# Patient Record
Sex: MALE | Race: WHITE | NOT HISPANIC OR LATINO | Employment: FULL TIME | URBAN - METROPOLITAN AREA
[De-identification: names, ages, dates, MRNs, and addresses within clinical notes are randomized per-mention and may not be internally consistent; named-entity substitution may affect disease eponyms.]

---

## 2017-11-10 ENCOUNTER — ALLSCRIPTS OFFICE VISIT (OUTPATIENT)
Dept: OTHER | Facility: OTHER | Age: 42
End: 2017-11-10

## 2017-11-10 DIAGNOSIS — E78.5 HYPERLIPIDEMIA: ICD-10-CM

## 2017-12-12 ENCOUNTER — GENERIC CONVERSION - ENCOUNTER (OUTPATIENT)
Dept: OTHER | Facility: OTHER | Age: 42
End: 2017-12-12

## 2017-12-14 ENCOUNTER — LAB CONVERSION - ENCOUNTER (OUTPATIENT)
Dept: OTHER | Facility: OTHER | Age: 42
End: 2017-12-14

## 2017-12-14 LAB
A/G RATIO (HISTORICAL): 2 (CALC) (ref 1–2.5)
ALBUMIN SERPL BCP-MCNC: 4.7 G/DL (ref 3.6–5.1)
ALP SERPL-CCNC: 54 U/L (ref 40–115)
ALT SERPL W P-5'-P-CCNC: 15 U/L (ref 9–46)
AST SERPL W P-5'-P-CCNC: 17 U/L (ref 10–40)
BILIRUB SERPL-MCNC: 0.8 MG/DL (ref 0.2–1.2)
BUN SERPL-MCNC: 20 MG/DL (ref 7–25)
BUN/CREA RATIO (HISTORICAL): NORMAL (CALC) (ref 6–22)
CALCIUM SERPL-MCNC: 9.5 MG/DL (ref 8.6–10.3)
CHLORIDE SERPL-SCNC: 104 MMOL/L (ref 98–110)
CHOLEST SERPL-MCNC: 178 MG/DL
CO2 SERPL-SCNC: 28 MMOL/L (ref 20–31)
CREAT SERPL-MCNC: 1.18 MG/DL (ref 0.6–1.35)
EGFR AFRICAN AMERICAN (HISTORICAL): 86 ML/MIN/1.73M2
EGFR-AMERICAN CALC (HISTORICAL): 75 ML/MIN/1.73M2
GAMMA GLOBULIN (HISTORICAL): 2.4 G/DL (CALC) (ref 1.9–3.7)
GLUCOSE (HISTORICAL): 90 MG/DL (ref 65–99)
HDLC SERPL-MCNC: 49 MG/DL
LDL CHOLESTEROL DIRECT (HISTORICAL): 111 MG/DL
POTASSIUM SERPL-SCNC: 4.5 MMOL/L (ref 3.5–5.3)
SODIUM SERPL-SCNC: 141 MMOL/L (ref 135–146)
TOTAL PROTEIN (HISTORICAL): 7.1 G/DL (ref 6.1–8.1)
TRIGL SERPL-MCNC: 93 MG/DL

## 2017-12-21 ENCOUNTER — ALLSCRIPTS OFFICE VISIT (OUTPATIENT)
Dept: OTHER | Facility: OTHER | Age: 42
End: 2017-12-21

## 2017-12-22 NOTE — PROGRESS NOTES
Assessment   1  Hyperlipidemia (272 4) (E78 5)      1  Hyperlipidemia-responsive to weight loss  Triglycerides now normal with an LDL of 111  HDL is 49  He will continue to have yearly lab work with his work as a   I told him he should have follow-up exam in 2 years     2 health maintenance-again given prescription for Adacel vaccine REVIEW NEXT VISIT      #3 abnormal EKG-felt to be normal variant for this patient  He is a copy of his EKG to carry with him     #4 dyspepsia-had an EGD in 2008 in to be a gastritis  Had some issues over the last year related to antibiotics but at this point not an issue  Symptoms markedly improved with weight loss     #5 low back pain-an issue lumbar radiculopathy in the year 2012 which responded epidural  Had reinjury with low back pain later in the year 2012 and various treatments  This was 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  He had traction acupuncture  This point feels better and has no issues     #6 risk for melanoma -he has fair skin and cool eyes and knows he needs sunscreen     #7 benign nevi of the feet and sees dermatology yearly     #8 family history diabetes with his grandparents     #9 status post umbilical hernia repair          He will return for follow-up exam in 2 years  Has annual exam with the police department       Plan   Continue current diet and exercise program      History of Present Illness   HPI: He had concerns about hyperlipidemia  He is not sure of exact numbers but when he had screening labs done 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 was between 202 40  He has lost 30 pounds  Recent labs show an LDL of 111 cholesterol 178 triglycerides 93 chemistry profile normal with an HDL of 49  We had a long discussion today regarding the above  We reviewed atherosclerosis  We reviewed risk factors  He is not a smoker  He does not have diabetes or hypertension and denies strong family history of vascular disease   I told him his goal LDL is 100-130  He hopes to lose more weight  He says he will have access to yearly exam as a   otherwise feels well  He says his energy level is better  He has not had any reflux symptoms since he lost weight  This was a 30 minutes visit with more than 50% of the time spent counseling the patient on risk factors for atherosclerosis  Multiple questions answered and treatment plan formulated   had his annual exam with dermatology and had full skin exam a week ago was told all was benign      Review of Systems   Complete-Male:      Constitutional: No fever or chills, feels well, no tiredness, no recent weight gain or weight loss  Eyes: No complaints of eye pain, no red eyes, no discharge from eyes, no itchy eyes  ENT: no complaints of earache, no hearing loss, no nosebleeds, no nasal discharge, no sore throat, no hoarseness  Cardiovascular: No complaints of slow heart rate, no fast heart rate, no chest pain, no palpitations, no leg claudication, no lower extremity  Respiratory: No complaints of shortness of breath, no wheezing, no cough, no SOB on exertion, no orthopnea or PND  Gastrointestinal: No complaints of abdominal pain, no constipation, no nausea or vomiting, no diarrhea or bloody stools  Genitourinary: No complaints of dysuria, no incontinence, no hesitancy, no nocturia, no genital lesion, no testicular pain  Musculoskeletal: No complaints of arthralgia, no myalgias, no joint swelling or stiffness, no limb pain or swelling  Integumentary: No complaints of skin rash or skin lesions, no itching, no skin wound, no dry skin  Neurological: No compliants of headache, no confusion, no convulsions, no numbness or tingling, no dizziness or fainting, no limb weakness, no difficulty walking  Psychiatric: Is not suicidal, no sleep disturbances, no anxiety or depression, no change in personality, no emotional problems        Endocrine: No complaints of proptosis, no hot flashes, no muscle weakness, no erectile dysfunction, no deepening of the voice, no feelings of weakness  Hematologic/Lymphatic: No complaints of swollen glands, no swollen glands in the neck, does not bleed easily, no easy bruising  Active Problems   1  Chest pain (786 50) (R07 9)   2  Dyspepsia (536 8) (K30)   3  Hyperlipidemia (272 4) (E78 5)   4  Low back pain (724 2) (M54 5)   5  Muscle ache (729 1) (M79 1)   6  Need for prophylactic vaccination and inoculation against influenza (V04 81) (Z23)   7  Pigmented nevus (216 9) (D22 9)   8  Skin rash (782 1) (R21)    Past Medical History   1  History of Acute upper respiratory infection (465 9) (J06 9)   2  History of acute sinusitis (V12 69) (Z87 09)   3  History of common cold (V12 09) (Z86 19)   4  History of heartburn (V12 79) (Z87 898)   5  History of sinusitis (V12 69) (Z87 09)   6  History of Type B influenza (487 1) (J10 1)  Active Problems And Past Medical History Reviewed: The active problems and past medical history were reviewed and updated today  Surgical History   1  History of Umbilical Hernia Repair    Family History   Mother    1  Family history of Malignant Neoplasm Of The Oral Cavity (V16 0)   2  Family history of Mother's Year Of Birth   3  Family history of Rheumatoid Arthritis  Father    4  Family history of Father's Year Of Birth   5  Family history of Hypertension (V17 49)   6  Family history of Irritable Bowel Syndrome  Sister    7  Family history of Sister's Year Of Birth  Family History    8  Family history of Heart Disease (V17 49)  Family History Reviewed: The family history was reviewed and updated today  Social History    · Denied: History of Drug Use   · Never A Smoker   · Never Drank Alcohol  Social History Reviewed: The social history was reviewed and updated today  The social history was reviewed and is unchanged  Current Meds    1   Daily Vitamins Oral Tablet; TAKE 1 TABLET DAILY; Therapy: (Recorded:28Mar2014) to Recorded   2  MethylPREDNISolone 4 MG Oral Tablet Therapy Pack (Medrol); use as directed; Therapy: [id number] to (Last Rx:[id number])  Requested for: 22YUM2978 Ordered  Medication List Reviewed: The medication list was reviewed and updated today  Allergies   1  No Known Drug Allergies  2  No Known Environmental Allergies   3  No Known Food Allergies    Vitals   Vital Signs    Recorded: 21Dec2017 08:12AM Recorded: 00Jkk5339 07:30AM   Heart Rate 68    Respiration 14    Systolic 943    Diastolic 76    Height  6 ft    Weight  199 lb 6 oz   BMI Calculated  27 04   BSA Calculated  2 13     Physical Exam        Constitutional      General appearance: No acute distress, well appearing and well nourished  Head and Face      Head and face: Normal        Palpation of the face and sinuses: No sinus tenderness  Eyes      Conjunctiva and lids: No erythema, swelling or discharge  Pupils and irises: Equal, round, reactive to light  Ears, Nose, Mouth, and Throat      External inspection of ears and nose: Normal        Otoscopic examination: Tympanic membranes translucent with normal light reflex  Canals patent without erythema  Hearing: Normal        Nasal mucosa, septum, and turbinates: Normal without edema or erythema  Lips, teeth, and gums: Normal, good dentition  Oropharynx: Normal with no erythema, edema, exudate or lesions  Neck      Neck: Supple, symmetric, trachea midline, no masses  Thyroid: Normal, no thyromegaly  Pulmonary      Respiratory effort: No increased work of breathing or signs of respiratory distress  Percussion of chest: Normal        Palpation of chest: Normal        Auscultation of lungs: Clear to auscultation  Cardiovascular      Palpation of heart: Normal PMI, no thrills  Auscultation of heart: Normal rate and rhythm, normal S1 and S2, no murmurs         Carotid pulses: 2+ bilaterally  Abdominal aorta: Normal        Femoral pulses: 2+ bilaterally  Pedal pulses: 2+ bilaterally  Peripheral vascular exam: Normal        Examination of extremities for edema and/or varicosities: Normal        Chest      Breasts: Normal, no dimpling or skin changes appreciated  Palpation of breasts and axillae: Normal, no masses palpated  Chest: Normal        Abdomen      Abdomen: Non-tender, no masses  Liver and spleen: No hepatomegaly or splenomegaly  Examination for hernias: No hernias appreciated  Anus, perineum, and rectum: Normal sphincter tone, no masses, no prolapse  Genitourinary      Scrotal contents: Normal testes, no masses  Penis: Normal, no lesions  Lymphatic      Palpation of lymph nodes in neck: No lymphadenopathy  Palpation of lymph nodes in axillae: No lymphadenopathy  Palpation of lymph nodes in groin: No lymphadenopathy  Palpation of lymph nodes in other areas: No lymphadenopathy  Musculoskeletal      Gait and station: Normal        Inspection/palpation of digits and nails: Normal without clubbing or cyanosis  Inspection/palpation of joints, bones, and muscles: Normal        Range of motion: Normal        Stability: Normal        Muscle strength/tone: Normal        Skin      Skin and subcutaneous tissue: Normal without rashes or lesions  Palpation of skin and subcutaneous tissue: Normal turgor  Neurologic      Cranial nerves: Cranial nerves 2-12 intact  Reflexes: 2+ and symmetric  Sensation: No sensory loss  Psychiatric      Judgment and insight: Normal        Orientation to person, place and time: Normal        Recent and remote memory: Intact         Mood and affect: Normal        Signatures    Electronically signed by : SHANE Hull ; Dec 21 2017  8:48AM EST                       (Author)

## 2018-01-22 VITALS
HEART RATE: 68 BPM | SYSTOLIC BLOOD PRESSURE: 126 MMHG | RESPIRATION RATE: 14 BRPM | DIASTOLIC BLOOD PRESSURE: 76 MMHG | HEIGHT: 72 IN | WEIGHT: 199.38 LBS | BODY MASS INDEX: 27.01 KG/M2

## 2018-04-30 RX ORDER — AZITHROMYCIN 250 MG/1
TABLET, FILM COATED ORAL
Qty: 6 TABLET | OUTPATIENT
Start: 2018-04-30

## 2018-05-01 ENCOUNTER — TELEPHONE (OUTPATIENT)
Dept: INTERNAL MEDICINE CLINIC | Facility: CLINIC | Age: 43
End: 2018-05-01

## 2018-05-01 ENCOUNTER — DOCUMENTATION (OUTPATIENT)
Dept: INTERNAL MEDICINE CLINIC | Facility: CLINIC | Age: 43
End: 2018-05-01

## 2018-05-01 NOTE — PROGRESS NOTES
Patient called the office on May 1st feeling poorly with progressive worsening respiratory infection with severe ear pain and pain over the sinuses-concerned about bacterial infection-placed on Augmentin 875 milligrams b i d  for 1 week

## 2018-05-01 NOTE — TELEPHONE ENCOUNTER
Per doc    Augmentin 875 mg 1 po bid for 1 week disp 14 refills 0  otc afrin nasal spray 2 sprays in each nostril bid       Spoke with his wife , she is aware

## 2018-05-01 NOTE — TELEPHONE ENCOUNTER
It called stating he has been sick for about 2-3 days  Symptoms : pain I left ear , pain in the jaw lines     HE STATED HE THINKS he has a possoble sinus infection/  He has used otc tylenol sinus but nothing is helping     Please advise  Cb [phone]

## 2018-07-09 ENCOUNTER — TELEPHONE (OUTPATIENT)
Dept: INTERNAL MEDICINE CLINIC | Facility: CLINIC | Age: 43
End: 2018-07-09

## 2018-07-12 ENCOUNTER — OFFICE VISIT (OUTPATIENT)
Dept: INTERNAL MEDICINE CLINIC | Facility: CLINIC | Age: 43
End: 2018-07-12
Payer: COMMERCIAL

## 2018-07-12 VITALS
SYSTOLIC BLOOD PRESSURE: 126 MMHG | OXYGEN SATURATION: 98 % | DIASTOLIC BLOOD PRESSURE: 88 MMHG | HEART RATE: 79 BPM | TEMPERATURE: 98.4 F | RESPIRATION RATE: 16 BRPM

## 2018-07-12 DIAGNOSIS — H61.22 LEFT EAR IMPACTED CERUMEN: Primary | ICD-10-CM

## 2018-07-12 DIAGNOSIS — J01.90 ACUTE SINUSITIS, RECURRENCE NOT SPECIFIED, UNSPECIFIED LOCATION: ICD-10-CM

## 2018-07-12 DIAGNOSIS — H60.502 ACUTE OTITIS EXTERNA OF LEFT EAR, UNSPECIFIED TYPE: ICD-10-CM

## 2018-07-12 PROCEDURE — 69209 REMOVE IMPACTED EAR WAX UNI: CPT | Performed by: INTERNAL MEDICINE

## 2018-07-12 PROCEDURE — 99214 OFFICE O/P EST MOD 30 MIN: CPT | Performed by: INTERNAL MEDICINE

## 2018-07-12 RX ORDER — OFLOXACIN 3 MG/ML
10 SOLUTION AURICULAR (OTIC) DAILY
Qty: 5 ML | Refills: 0 | Status: SHIPPED | OUTPATIENT
Start: 2018-07-12 | End: 2018-07-19

## 2018-07-12 RX ORDER — CEFUROXIME AXETIL 250 MG/1
TABLET ORAL
COMMUNITY
Start: 2018-05-28 | End: 2021-03-02

## 2018-07-12 RX ORDER — METHYLPREDNISOLONE 4 MG/1
TABLET ORAL
COMMUNITY
Start: 2016-09-06 | End: 2021-03-02

## 2018-07-12 RX ORDER — AMOXICILLIN AND CLAVULANATE POTASSIUM 875; 125 MG/1; MG/1
TABLET, FILM COATED ORAL
Refills: 0 | COMMUNITY
Start: 2018-05-01 | End: 2021-03-02

## 2018-07-12 RX ORDER — AZITHROMYCIN 250 MG/1
TABLET, FILM COATED ORAL
Refills: 1 | COMMUNITY
Start: 2018-04-29 | End: 2021-03-02

## 2018-07-12 NOTE — PATIENT INSTRUCTIONS
1, ENT specialist appointment - Dr Marly Cadet, [address] in [place], [place] at 1:15pm  2  Once earwax is removed, recommend to use antibiotic ear drops    Cerumen Impaction   WHAT YOU NEED TO KNOW:   Cerumen impaction is the blockage of the outer ear canal by tightly packed cerumen (earwax)  It is generally treated with procedures such as flushing or suctioning the ear canal or the use of instruments to remove the impaction  DISCHARGE INSTRUCTIONS:   Medicines:  · Ear drops: These are used to soften the wax in your ear  Wax softening ear drops may be bought without a prescription  Ask your healthcare provider how often you should use this medicine  Read the instructions carefully before you use the ear drops  Do the following when you put in ear drops:     ¨ Warm the drops by holding the bottle in your hands for a few minutes  Cold ear drops may make you dizzy  ¨ Lie down with the affected ear toward the ceiling  You may also stand with your head tilted to one side  ¨ Pull your ear lobe up and back, and place the correct number of drops into the ear  ¨ Keep your ear facing up for 5 to 10 minutes so the drops coat the outer ear canal      ¨ Gently clean the outer part of the ear with a cotton swab  Do not  place the cotton swab or anything inside your ear canal  This increases the risk of damaging your eardrum  · Take your medicine as directed  Contact your healthcare provider if you think your medicine is not helping or if you have side effects  Tell him of her if you are allergic to any medicine  Keep a list of the medicines, vitamins, and herbs you take  Include the amounts, and when and why you take them  Bring the list or the pill bottles to follow-up visits  Carry your medicine list with you in case of an emergency  Follow up with your healthcare provider as directed:  Write down your questions so you remember to ask them during your visits     Contact your healthcare provider if:   · You have a fever  · You have trouble hearing or ringing in your ear  · You have questions about your condition or care  Return to the emergency department if:   · You feel dizzy  · You have discharge or blood coming out of your ear  · Your ear pain does not go away or gets worse  © 2017 [employer] Information is for End User's use only and may not be sold, redistributed or otherwise used for commercial purposes  All illustrations and images included in CareNotes® are the copyrighted property of [employer]  or Bradly Jean Baptiste  The above information is an  only  It is not intended as medical advice for individual conditions or treatments  Talk to your doctor, nurse or pharmacist before following any medical regimen to see if it is safe and effective for you

## 2018-07-12 NOTE — PROGRESS NOTES
Assessment/Plan:     Diagnoses and all orders for this visit:    Left ear impacted cerumen  Comments:  s/p partially successful irrigation but significant wax remains  dry ear precuations & ENT appt booked for tmrw  Ordered abx otic gtts for once cerumen removed  Orders:  -     Ambulatory Referral to Otolaryngology; Future    Acute sinusitis, recurrence not specified, unspecified location  Comments:  no signs of acute bacterial sinus infection at this time, having residual sinus fluid/needs nasal endoscopy?, ENT appt booked for tmrw as below  Orders:  -     Ambulatory Referral to Otolaryngology; Future    Acute otitis externa of left ear, unspecified type  Comments:  abx ear gtts ordered as above, will see ENT first for cerumen removal then use ear gtts  Orders:  -     Ambulatory Referral to Otolaryngology; Future  -     ofloxacin (FLOXIN OTIC) 0 3 % otic solution; Administer 10 drops into the left ear daily for 7 days    Other orders  -     amoxicillin-clavulanate (AUGMENTIN) 875-125 mg per tablet; TAKE 1 TABLET TWICE DAILY FOR 7 DAYS  -     azithromycin (ZITHROMAX) 250 mg tablet; TAKE 2 TABLETS BY MOUTH ON DAY 1, THEN TAKE 1 TABLET DAILY ON DAYS 2-5  -     cefuroxime (CEFTIN) 250 mg tablet;   -     Methylprednisolone 4 MG TBPK; Take by mouth  -     Multiple Vitamins-Minerals (MULTIVITAMIN ADULT PO); Take 1 tablet by mouth daily  -     Ear cerumen removal      called and spoke to Maria C Barth at Dr Mireya Garcia office/ENT & scheduled pt for appt tmrw with Dr Aishwarya Buckner to arrive at 1:15pm at [facility] ENT office on [facility] [place]    Subjective:      Patient ID: Antonio Bowles is a 37 y o  male  HPI    New to me, here with c/o left ear fullness, discomfort and recurrent sinus infections over recent months  Treated 2 mos ago by PCP with augmentin which nearly resolved sx but then they returned and pt went to  on 5/28 and treated with ceftin for 7-10 days which helped but did not resolve his sx    Having nasal drainage and fullness in sinuses but no cough, fever, rhinorrhea or drainage  No hx of sinus problems in past     No ear drainage or ear surgery in past but fullness and some soreness  He noticed a momentary spin sensation when turning his head from left to right but passes quickly  No other associated sx but on exam he has significant impaction of cerumen of almost with entire left ear canal   No other complaints  No past medical history on file  Vitals:    07/12/18 0904   BP: 126/88   BP Location: Right arm   Patient Position: Sitting   Cuff Size: Standard   Pulse: 79   Resp: 16   Temp: 98 4 °F (36 9 °C)   TempSrc: Tympanic   SpO2: 98%     There is no height or weight on file to calculate BMI  Current Outpatient Prescriptions:     Methylprednisolone 4 MG TBPK, Take by mouth, Disp: , Rfl:     amoxicillin-clavulanate (AUGMENTIN) 875-125 mg per tablet, TAKE 1 TABLET TWICE DAILY FOR 7 DAYS, Disp: , Rfl: 0    azithromycin (ZITHROMAX) 250 mg tablet, TAKE 2 TABLETS BY MOUTH ON DAY 1, THEN TAKE 1 TABLET DAILY ON DAYS 2-5, Disp: , Rfl: 1    cefuroxime (CEFTIN) 250 mg tablet, , Disp: , Rfl:     Multiple Vitamins-Minerals (MULTIVITAMIN ADULT PO), Take 1 tablet by mouth daily, Disp: , Rfl:     ofloxacin (FLOXIN OTIC) 0 3 % otic solution, Administer 10 drops into the left ear daily for 7 days, Disp: 5 mL, Rfl: 0  No Known Allergies      Review of Systems   Constitutional: Negative for fever  HENT: Positive for congestion and ear pain  Negative for ear discharge, rhinorrhea and tinnitus  Eyes: Negative for visual disturbance  Respiratory: Negative for cough and shortness of breath  Cardiovascular: Negative for chest pain  Gastrointestinal: Negative for abdominal pain  Musculoskeletal: Negative for arthralgias  Allergic/Immunologic: Positive for environmental allergies  Neurological: Negative for headaches  Psychiatric/Behavioral: Negative for dysphoric mood           Objective:      /88 (BP Location: Right arm, Patient Position: Sitting, Cuff Size: Standard)   Pulse 79   Temp 98 4 °F (36 9 °C) (Tympanic)   Resp 16   SpO2 98%          Physical Exam   Constitutional: He appears well-developed and well-nourished  HENT:   Head: Normocephalic and atraumatic  Right Ear: External ear normal    Left Ear: There is swelling and tenderness  Nose: No mucosal edema or rhinorrhea  Right sinus exhibits no maxillary sinus tenderness and no frontal sinus tenderness  Left sinus exhibits maxillary sinus tenderness and frontal sinus tenderness  Mouth/Throat: Oropharynx is clear and moist    Left ear fully impacted with cerumen   Eyes: Pupils are equal, round, and reactive to light  Cardiovascular: Normal rate, regular rhythm and normal heart sounds  No murmur heard  Pulmonary/Chest: Effort normal and breath sounds normal  He has no wheezes  He has no rales  Abdominal: Soft  Bowel sounds are normal  There is no tenderness  Musculoskeletal: He exhibits no edema  Lymphadenopathy:     He has no cervical adenopathy  Neurological: He is alert  Psychiatric: He has a normal mood and affect  His behavior is normal    Vitals reviewed  Ear cerumen removal  Date/Time: 7/12/2018 10:44 AM  Performed by: Jose Shoemaker  Authorized by: Jose Shoemaker     Patient location:  Clinic  Other Assisting Provider: Yes (comment) Yusuf Acevedo MA)    Consent:     Consent obtained:  Verbal    Consent given by:  Patient    Risks discussed:  Pain, incomplete removal and dizziness    Alternatives discussed:  Observation and referral  Universal protocol:     Patient identity confirmed:  Verbally with patient  Procedure details:     Local anesthetic:  None    Location:  L ear    Procedure type: irrigation      Approach:  External  Post-procedure details:     Complication:  None    Post-procedure hearing quality: minimally improved  Patient tolerance of procedure:   Tolerated well, no immediate complications  Comments:      Partially successful, Small amt of cerumen removed with irrigation  On Left ear exam post irrigation, signs of acute oitits externa noted    Dry ear precautions advised & ENT appt booked for tmrw

## 2019-02-10 NOTE — TELEPHONE ENCOUNTER
MADE APPOINTMENT FOR Thursday WITH DR HUDDLESTON
Pt still battling what he think is a sinus infection or an inner ear problem  He feels pressure, ear and jaw pain, pressure in his face, almost a floating feeling in his left ear  He's tried otc flonase, claritin, etc  Seems to ease the symptoms, but not actually eliminate them  Asked when you could see him 
Schedule him on Thursday July 12th with Dr Lynsey Orr
4

## 2019-04-30 ENCOUNTER — TELEPHONE (OUTPATIENT)
Dept: INTERNAL MEDICINE CLINIC | Facility: CLINIC | Age: 44
End: 2019-04-30

## 2019-05-02 ENCOUNTER — OFFICE VISIT (OUTPATIENT)
Dept: INTERNAL MEDICINE CLINIC | Facility: CLINIC | Age: 44
End: 2019-05-02
Payer: COMMERCIAL

## 2019-05-02 ENCOUNTER — TELEPHONE (OUTPATIENT)
Dept: INTERNAL MEDICINE CLINIC | Facility: CLINIC | Age: 44
End: 2019-05-02

## 2019-05-02 VITALS
OXYGEN SATURATION: 98 % | SYSTOLIC BLOOD PRESSURE: 122 MMHG | DIASTOLIC BLOOD PRESSURE: 70 MMHG | RESPIRATION RATE: 14 BRPM | HEART RATE: 86 BPM

## 2019-05-02 DIAGNOSIS — J01.10 ACUTE NON-RECURRENT FRONTAL SINUSITIS: Primary | ICD-10-CM

## 2019-05-02 DIAGNOSIS — E78.01 FAMILIAL HYPERCHOLESTEROLEMIA: ICD-10-CM

## 2019-05-02 PROCEDURE — 99213 OFFICE O/P EST LOW 20 MIN: CPT | Performed by: INTERNAL MEDICINE

## 2019-05-02 PROCEDURE — 1036F TOBACCO NON-USER: CPT | Performed by: INTERNAL MEDICINE

## 2019-09-05 ENCOUNTER — EVALUATION (OUTPATIENT)
Dept: PHYSICAL THERAPY | Facility: CLINIC | Age: 44
End: 2019-09-05
Payer: COMMERCIAL

## 2019-09-05 DIAGNOSIS — M25.562 ACUTE PAIN OF LEFT KNEE: Primary | ICD-10-CM

## 2019-09-05 PROCEDURE — 97110 THERAPEUTIC EXERCISES: CPT | Performed by: PHYSICAL THERAPIST

## 2019-09-05 PROCEDURE — 97162 PT EVAL MOD COMPLEX 30 MIN: CPT | Performed by: PHYSICAL THERAPIST

## 2019-09-05 PROCEDURE — 97140 MANUAL THERAPY 1/> REGIONS: CPT | Performed by: PHYSICAL THERAPIST

## 2019-09-05 NOTE — PROGRESS NOTES
PT Evaluation     Today's date: 2019  Patient name: Marie Jones  : 1975  MRN: [id number]  Referring provider: Self, Referral  Dx:   Encounter Diagnosis     ICD-10-CM    1  Acute pain of left knee M25 562                   Assessment  Assessment details: Pt presents to PT without a script, utilizing his direct access benefits  Pt presents with s/s consistent with PFPS, and semitendinosus tendinitis likely secondary to gait abnormalities  Pt will benefit from 30 days of PT then will be referred to his PCP if sx are not resolved  Impairments: abnormal gait, abnormal muscle tone, abnormal or restricted ROM, abnormal movement, activity intolerance, impaired physical strength, lacks appropriate home exercise program and pain with function    Goals  ST-2 weeks  1   Pt will decrease pain > 50%  2   Pt will restore normalized ROM  LT-4 weeks  1   Pt will decrease pain > 75%  2   Pt will be able sit an unlimited time without pain  Plan  Planned modality interventions: low level laser therapy  Planned therapy interventions: manual therapy, patient education, body mechanics training, strengthening, stretching, therapeutic activities, therapeutic exercise, therapeutic training, flexibility, functional ROM exercises, gait training, home exercise program and neuromuscular re-education  Frequency: 2x week  Duration in weeks: 4        Subjective Evaluation    History of Present Illness  Date of onset: 2019  Mechanism of injury: Pt is a 53 yom c/o L anteriolateral and posteriomedial knee pain that began with an insidious onset approximately 6 weeks ago  Pt reports running 2-3 times weekly for 5-7 miles per run  Pt reports that he has been doing this for the past 4 years  Pt also reports wearing an orthotic in his R work boot for the past 4-5 years    Pain  Current pain ratin  At best pain rating: 3  At worst pain ratin  Quality: dull ache and sharp  Relieving factors: ice, rest and change in position  Aggravating factors: sitting      Diagnostic Tests  No diagnostic tests performed  Patient Goals  Patient goals for therapy: decreased edema, decreased pain, increased motion, increased strength, independence with ADLs/IADLs and return to sport/leisure activities          Objective     Passive Range of Motion   Left Hip   Flexion: 120 degrees   External rotation (90/90): 35 degrees   Internal rotation (90/90): 20 degrees     Right Hip   Flexion: 120 degrees   External rotation (90/90): 30 degrees   Internal rotation (90/90): 20 degrees   Left Knee   Flexion: 125 degrees with pain  Extension: 3 degrees with pain    Right Knee   Flexion: 130 degrees   Extension: -2 degrees   Left Ankle/Foot    Dorsiflexion (ke): 3 degrees     Right Ankle/Foot    Dorsiflexion (ke): 5 degrees     Additional Passive Range of Motion Details  SL heel raises: R:10 pain  L: 22  Mobility   Patellar Mobility:   Left Knee   WFL: medial, lateral and superior  Hypomobile: left inferior    Right Knee   WFL: medial, lateral, superior and inferior    Strength/Myotome Testing     Left Hip   Planes of Motion   Flexion: 4+  Extension: 4+  Abduction: 4+  Adduction: 4+    Right Hip   Planes of Motion   Flexion: 5  Extension: 5  Abduction: 5  Adduction: 5    Left Knee   Flexion: 4  Extension: 4  Quadriceps contraction: fair    Right Knee   Flexion: 5  Extension: 5  Quadriceps contraction: good    Additional Strength Details  Gait: B mild Hip ER with a mild L > R medial heel whip  Standing arch height: R: normal, L: mild dropping  Balance: SLS: B: > 30"   Edema: 1+ L knee  TTP: semitendinosus insertion  Tests     Left Knee   Negative anterior drawer, anterior Lachman, lateral Nigel, medial Nigel, patellar apprehension, patellar compression, patella-femoral grind, posterior drawer, valgus stress test at 0 degrees, valgus stress test at 30 degrees, varus stress test at 0 degrees and varus stress test at 30 degrees  Precautions: none  Dx: L PFPS, L semitendinosus/ medial capsule irritation        Manual  9/5            Graston semitendinosus 5 min            Graston distal lateral quad 3 min            Laser semitendinosus/ medial capsule 2000J            Laser patella 2000J            L knee PROM                 Exercise Diary  9/5            Standing GA stretch 15"x3            DL/SL ecc GA heelraises             Standing ecc knee flexion             Supine HA stretch with knee block 15"x3            Supine heelslides 5"x10            Quad sets with towel under knee 5"x5            DLS: SLR 9T41 2L10           SL hip ABD  3x20                                                                                                                                                                           Modalities

## 2019-09-10 ENCOUNTER — OFFICE VISIT (OUTPATIENT)
Dept: PHYSICAL THERAPY | Facility: CLINIC | Age: 44
End: 2019-09-10
Payer: COMMERCIAL

## 2019-09-10 DIAGNOSIS — M25.562 ACUTE PAIN OF LEFT KNEE: Primary | ICD-10-CM

## 2019-09-10 PROCEDURE — 97140 MANUAL THERAPY 1/> REGIONS: CPT | Performed by: PHYSICAL THERAPIST

## 2019-09-10 PROCEDURE — 97110 THERAPEUTIC EXERCISES: CPT | Performed by: PHYSICAL THERAPIST

## 2019-09-10 PROCEDURE — 97112 NEUROMUSCULAR REEDUCATION: CPT | Performed by: PHYSICAL THERAPIST

## 2019-09-10 NOTE — PROGRESS NOTES
Daily Note     Today's date: 9/10/2019  Patient name: Lisa Solares  : 1975  MRN: [id number]  Referring provider: Self, Referral  Dx:   Encounter Diagnosis     ICD-10-CM    1  Acute pain of left knee M25 562                   Subjective: Pt reports good compliance with HEP  Objective: See treatment diary below  Running shoes are a neutral [employer] cross  with L > R lateral heel wear  Assessment: Secondary to decreased L arch support a moderate pronation control shoe was recommended  Plan: Cont POC  Precautions: none  Dx: L PFPS, L semitendinosus/ medial capsule irritation        Manual  9/5 9/10           Graston semitendinosus 5 min 5 min           Graston distal lateral quad 3 min 3 min           Laser semitendinosus/ medial capsule 2000J 3300J           Laser patella 2000J 3300J           L knee PROM  5"x5 ea               Exercise Diary  10           Standing GA stretch 15"x3 15"x3           DL ecc GA heelraises  3x10           Standing ecc knee flexion  2x10           Supine HA stretch with knee block 15"x3 15"x3           Supine heelslides 5"x10 5"x20           Quad sets with towel under knee 5"x5 5"x20           DLS: SLR 9C18 0M81           SL hip ABD  3x20                                                                                                                                                                           Modalities

## 2019-09-13 ENCOUNTER — OFFICE VISIT (OUTPATIENT)
Dept: PHYSICAL THERAPY | Facility: CLINIC | Age: 44
End: 2019-09-13
Payer: COMMERCIAL

## 2019-09-13 DIAGNOSIS — M25.562 ACUTE PAIN OF LEFT KNEE: Primary | ICD-10-CM

## 2019-09-13 PROCEDURE — 97140 MANUAL THERAPY 1/> REGIONS: CPT | Performed by: PHYSICAL THERAPIST

## 2019-09-13 PROCEDURE — 97110 THERAPEUTIC EXERCISES: CPT | Performed by: PHYSICAL THERAPIST

## 2019-09-13 PROCEDURE — 97112 NEUROMUSCULAR REEDUCATION: CPT | Performed by: PHYSICAL THERAPIST

## 2019-09-13 NOTE — PROGRESS NOTES
Daily Note     Today's date: 2019  Patient name: Mackenzie De La Torre  : 1975  MRN: [id number]  Referring provider: Self, Referral  Dx:   Encounter Diagnosis     ICD-10-CM    1  Acute pain of left knee M25 562                   Subjective: Pt reports 7-8/10 sharp L knee pain with descending stairs  Pt reports pain is sharp and resolves quickly  Pt reports getting [employer] running shoes  Objective: See treatment diary below   med patella mobility, add taping  Assessment: Pt demonstrated improved knee ext ROM  Plan: Cont POC  Assess running shoe stability  Precautions: none  Dx: L PFPS, L semitendinosus/ medial capsule irritation        Manual  9/5 9/10 9/13          Graston semitendinosus 5 min 5 min 5 min          Graston distal lateral quad 3 min 3 min 3 min          Laser semitendinosus/ medial capsule 2000J 3300J 3000J          Laser patella 2000J 3300J 3000J          L knee PROM  5"x5 ea 5"x5 ea              Exercise Diary  9/5 9/10 9/13          Standing GA stretch 15"x3 15"x3 15"x3          DL ecc GA heelraises  3x10 3x10          Standing ecc knee flexion  2x10 2x10          Supine HA stretch with knee block 15"x3 15"x3 15"x3          Supine heelslides 5"x10 5"x20 5"x20          Quad sets with towel under knee 5"x5 5"x20 5"x20          DLS: SLR 3V13 2K66 2O10          SL hip ABD  3x20 3x25                                                                                                                                                                          Modalities

## 2019-09-18 ENCOUNTER — OFFICE VISIT (OUTPATIENT)
Dept: PHYSICAL THERAPY | Facility: CLINIC | Age: 44
End: 2019-09-18
Payer: COMMERCIAL

## 2019-09-18 DIAGNOSIS — M25.562 ACUTE PAIN OF LEFT KNEE: Primary | ICD-10-CM

## 2019-09-18 PROCEDURE — 97110 THERAPEUTIC EXERCISES: CPT | Performed by: PHYSICAL THERAPIST

## 2019-09-18 PROCEDURE — 97112 NEUROMUSCULAR REEDUCATION: CPT | Performed by: PHYSICAL THERAPIST

## 2019-09-18 PROCEDURE — 97140 MANUAL THERAPY 1/> REGIONS: CPT | Performed by: PHYSICAL THERAPIST

## 2019-09-18 NOTE — PROGRESS NOTES
Daily Note     Today's date: 2019  Patient name: Yulia Freeman  : 1975  MRN: [id number]  Referring provider: Self, Referral  Dx:   Encounter Diagnosis     ICD-10-CM    1  Acute pain of left knee M25 562                   Subjective: Pt reports a marked improvement in his pain  Pt reports no posterior knee pain, relief with medial patella taping and at worst anterior knee pain with descending stairs  Objective: See treatment diary below  Added ecc lateral step-downs  Assessment: Pt demonstrated improved posterior capsule irritation, mild GA soreness  Plan: Cont POC  Precautions: none  Dx: L PFPS, L semitendinosus/ medial capsule irritation        Manual  9/5 9/10 9/13 9/18         Graston semitendinosus 5 min 5 min 5 min 3 min         Graston distal lateral quad 3 min 3 min 3 min 5 min         Laser semitendinosus/ medial capsule 2000J 3300J 3000J          Laser patella 2000J 3300J 3000J 4000J         L knee PROM  5"x5 ea 5"x5 ea              Exercise Diary  9/5 9/10 9/13 9/18         Standing GA stretch 15"x3 15"x3 15"x3 15":x3         DL ecc GA heelraises  3x10 3x10 3x10         Standing ecc knee flexion  2x10 2x10 3x10         Supine HA stretch with knee block 15"x3 15"x3 15"x3 15"x3         Supine heelslides 5"x10 5"x20 5"x20 5"x20         Quad sets with towel under knee 5"x5 5"x20 5"x20 5"x20         DLS: SLR 4U56 2M56 0R40 3x25         SL hip ABD  3x20 3x25 3x25         Lateral ecc step-downs    4"/ 2x10                                                                                                                                                            Modalities

## 2019-09-20 ENCOUNTER — OFFICE VISIT (OUTPATIENT)
Dept: PHYSICAL THERAPY | Facility: CLINIC | Age: 44
End: 2019-09-20
Payer: COMMERCIAL

## 2019-09-20 DIAGNOSIS — M25.562 ACUTE PAIN OF LEFT KNEE: Primary | ICD-10-CM

## 2019-09-20 PROCEDURE — 97112 NEUROMUSCULAR REEDUCATION: CPT | Performed by: PHYSICAL THERAPIST

## 2019-09-20 PROCEDURE — 97140 MANUAL THERAPY 1/> REGIONS: CPT | Performed by: PHYSICAL THERAPIST

## 2019-09-20 PROCEDURE — 97110 THERAPEUTIC EXERCISES: CPT | Performed by: PHYSICAL THERAPIST

## 2019-09-20 NOTE — PROGRESS NOTES
Daily Note     Today's date: 2019  Patient name: Chris Gomez  : 1975  MRN: [id number]  Referring provider: Self, Referral  Dx:   Encounter Diagnosis     ICD-10-CM    1  Acute pain of left knee M25 562                   Subjective: Pt reports less pain in the posterior knee, less pain with descending stairs  Pt reports continued relief with taping  Objective: See treatment diary below  Assessment: Pt demonstrated improved ecc quad control and irritability  Plan: Cont POC  Precautions: none  Dx: L PFPS, L semitendinosus/ medial capsule irritation        Manual  9/5 9/10 9/13 9/18 9/20        Graston semitendinosus 5 min 5 min 5 min 3 min 3 min        Graston distal lateral quad 3 min 3 min 3 min 5 min 5 min        Laser semitendinosus/ medial capsule 2000J 3300J 3000J  4000J        Laser patella 2000J 3300J 3000J 4000J 5000J        L knee PROM  5"x5 ea 5"x5 ea              Exercise Diary  9/5 9/10 9/13 9/18 9/20        Standing GA stretch 15"x3 15"x3 15"x3 15":x3 15"x3        DL ecc GA heelraises  3x10 3x10 3x10 3x10        Standing ecc knee flexion  2x10 2x10 3x10 3x10        Supine HA stretch with knee block 15"x3 15"x3 15"x3 15"x3 15"x3        Supine heelslides 5"x10 5"x20 5"x20 5"x20         Quad sets with towel under knee 5"x5 5"x20 5"x20 5"x20         DLS: SLR 5M78 2V93 6N06 3x25         SL hip ABD  3x20 3x25 3x25         Lateral ecc step-downs    4"/ 2x10 4"/ 1x10, 6" /2x10        Leg Press machine : SL      50#/ 3x10                                                                                                                                              Modalities

## 2019-09-24 ENCOUNTER — OFFICE VISIT (OUTPATIENT)
Dept: PHYSICAL THERAPY | Facility: CLINIC | Age: 44
End: 2019-09-24
Payer: COMMERCIAL

## 2019-09-24 DIAGNOSIS — M25.562 ACUTE PAIN OF LEFT KNEE: Primary | ICD-10-CM

## 2019-09-24 PROCEDURE — 97112 NEUROMUSCULAR REEDUCATION: CPT | Performed by: PHYSICAL THERAPIST

## 2019-09-24 PROCEDURE — 97110 THERAPEUTIC EXERCISES: CPT | Performed by: PHYSICAL THERAPIST

## 2019-09-24 PROCEDURE — 97140 MANUAL THERAPY 1/> REGIONS: CPT | Performed by: PHYSICAL THERAPIST

## 2019-09-24 NOTE — PROGRESS NOTES
Daily Note     Today's date: 2019  Patient name: Mely Gomez  : 1975  MRN: [id number]  Referring provider: Self, Referral  Dx:   Encounter Diagnosis     ICD-10-CM    1  Acute pain of left knee M25 562                   Subjective: Pt reports no posterior knee pain, mild anterolateral L knee pain with descending stairs  Objective: See treatment diary below  1+ edema, lacking 2 deg of L knee ext  Assessment: Pt demonstrated improved ecc quad control on stairs  Plan: Cont POC  Precautions: none  Dx: L PFPS, L semitendinosus/ medial capsule irritation        Manual  9/5 9/10 9/13 9/18 9/20 9/24       Graston semitendinosus 5 min 5 min 5 min 3 min 3 min 3 min       Graston distal lateral quad 3 min 3 min 3 min 5 min 5 min 5 min       Laser semitendinosus/ medial capsule 2000J 3300J 3000J  4000J np       Laser patella 2000J 3300J 3000J 4000J 5000J 5500J       Medial patella taping      3 min       L knee PROM  5"x5 ea 5"x5 ea   5"x5 ea           Exercise Diary  9/5 9/10 9/13 9/18 9/20 9/24       Standing GA stretch 15"x3 15"x3 15"x3 15":x3 15"x3 15"x3       DL ecc GA heelraises  3x10 3x10 3x10 3x10  DL/ SL con/ecc      Standing ecc knee flexion  2x10 2x10 3x10 3x10        Supine HA stretch with knee block 15"x3 15"x3 15"x3 15"x3 15"x3 15"x3       Supine heelslides 5"x10 5"x20 5"x20 5"x20  5"x20       Quad sets  5"x5 5"x20 5"x20 5"x20  5"x20       DLS: SLR 4Z56 5I71 0M63 3x25         SL hip ABD  3x20 3x25 3x25         Lateral ecc step-downs    4"/ 2x10 4"/ 1x10, 6" /2x10 6"/ 3x10 8"/       Leg Press machine : SL      50#/ 3x10 60#/ 3x10                                                                                                                                             Modalities

## 2019-09-26 ENCOUNTER — OFFICE VISIT (OUTPATIENT)
Dept: PHYSICAL THERAPY | Facility: CLINIC | Age: 44
End: 2019-09-26
Payer: COMMERCIAL

## 2019-09-26 DIAGNOSIS — M25.562 ACUTE PAIN OF LEFT KNEE: Primary | ICD-10-CM

## 2019-09-26 PROCEDURE — 97140 MANUAL THERAPY 1/> REGIONS: CPT | Performed by: PHYSICAL THERAPIST

## 2019-09-26 PROCEDURE — 97110 THERAPEUTIC EXERCISES: CPT | Performed by: PHYSICAL THERAPIST

## 2019-09-26 PROCEDURE — 97112 NEUROMUSCULAR REEDUCATION: CPT | Performed by: PHYSICAL THERAPIST

## 2019-09-30 ENCOUNTER — OFFICE VISIT (OUTPATIENT)
Dept: PHYSICAL THERAPY | Facility: CLINIC | Age: 44
End: 2019-09-30
Payer: COMMERCIAL

## 2019-09-30 DIAGNOSIS — M25.562 ACUTE PAIN OF LEFT KNEE: Primary | ICD-10-CM

## 2019-09-30 PROCEDURE — 97140 MANUAL THERAPY 1/> REGIONS: CPT | Performed by: PHYSICAL THERAPIST

## 2019-09-30 PROCEDURE — 97110 THERAPEUTIC EXERCISES: CPT | Performed by: PHYSICAL THERAPIST

## 2019-09-30 PROCEDURE — 97112 NEUROMUSCULAR REEDUCATION: CPT | Performed by: PHYSICAL THERAPIST

## 2019-09-30 NOTE — PROGRESS NOTES
Daily Note     Today's date: 2019  Patient name: Mark Esparza  : 1975  MRN: [id number]  Referring provider: Self, Referral  Dx:   Encounter Diagnosis     ICD-10-CM    1  Acute pain of left knee M25 562                   Subjective: Pt reports no posterior knee pain, mild but improving anterior knee pain with descending stairs  Pt reports that he feels the best when the tape is on  Objective: See treatment diary below  Edema: 1+  Assessment: Pt demonstrated improved ecc quad control, PFPS responding to taping and POC  Plan: Cont POC  Precautions: none  Dx: L PFPS      Manual  9/5 9/10 9/13 9/18 9/20 9/24 9/26 9/30     Graston semitendinosus 5 min 5 min 5 min 3 min 3 min 3 min 3 min      Graston distal lateral quad 3 min 3 min 3 min 5 min 5 min 5 min 4 min 4 min     Laser semitendinosus/ medial capsule 2000J 3300J 3000J  4000J np np      Laser patella 2000J 3300J 3000J 4000J 5000J 5500J 5500J 5500J     Medial patella taping      3 min 3 min 3 min     ITB foam roller        3 min     L knee PROM  5"x5 ea 5"x5 ea   5"x5 ea 5"x5 ea          Exercise Diary  9/5 9/10 9/13 9/18 9/20 9/24 9/26 9/30     Standing GA stretch 15"x3 15"x3 15"x3 15":x3 15"x3 15"x3 15"x3      DL ecc GA heelraises  3x10 3x10 3x10 3x10  DL/ SL con/ecc  3x10      Standing ecc knee flexion  2x10 2x10 3x10 3x10        Supine HA stretch with knee block 15"x3 15"x3 15"x3 15"x3 15"x3 15"x3 15"x3 15"x3     Supine heelslides 5"x10 5"x20 5"x20 5"x20  5"x20 5"x20      Quad sets  5"x5 5"x20 5"x20 5"x20  5"x20 5"x20      DLS: SLR 9E26 0L79 4D32 3x25         SL hip ABD  3x20 3x25 3x25    1#/ 3x20     Lateral ecc step-downs    4"/ 2x10 4"/ 1x10, 6" /2x10 6"/ 3x10 8"/  3x10 6"/ 1x10, 8"/ 2x10 8"/ 3x12    Leg Press machine : SL      50#/ 3x10 60#/ 3x10 60#  3x12 70#/ 3x10 80#/    Supine ITB stretch        15"x3     Side stepping with mini-squat        G/ 2x50 ft ea     bike        L1 5 min Modalities

## 2019-10-03 ENCOUNTER — OFFICE VISIT (OUTPATIENT)
Dept: PHYSICAL THERAPY | Facility: CLINIC | Age: 44
End: 2019-10-03
Payer: COMMERCIAL

## 2019-10-03 DIAGNOSIS — M25.562 ACUTE PAIN OF LEFT KNEE: Primary | ICD-10-CM

## 2019-10-03 PROCEDURE — 97110 THERAPEUTIC EXERCISES: CPT | Performed by: PHYSICAL THERAPIST

## 2019-10-03 PROCEDURE — 97112 NEUROMUSCULAR REEDUCATION: CPT | Performed by: PHYSICAL THERAPIST

## 2019-10-03 PROCEDURE — 97140 MANUAL THERAPY 1/> REGIONS: CPT | Performed by: PHYSICAL THERAPIST

## 2019-10-03 NOTE — PROGRESS NOTES
Daily Note     Today's date: 10/3/2019  Patient name: Wayne Coleman  : 1975  MRN: [id number]  Referring provider: Self, Referral  Dx:   Encounter Diagnosis     ICD-10-CM    1  Acute pain of left knee M25 562                   Subjective: Pt reports intermittent ant knee pain with descending stairs  Objective: See treatment diary below  Edema: trace  Assessment: Pt demonstrated edema and less frequent pain  Plan: Cont POC  Precautions: none  Dx: L PFPS      Manual  9/5 9/10 9/13 9/18 9/20 9/24 9/26 9/30 10/3    Graston semitendinosus 5 min 5 min 5 min 3 min 3 min 3 min 3 min      Graston distal lateral quad 3 min 3 min 3 min 5 min 5 min 5 min 4 min 4 min     Laser semitendinosus/ medial capsule 2000J 3300J 3000J  4000J np np      Laser patella 2000J 3300J 3000J 4000J 5000J 5500J 5500J 5500J 5500J    Medial patella taping      3 min 3 min 3 min 3 min    ITB foam roller        3 min     L knee PROM  5"x5 ea 5"x5 ea   5"x5 ea 5"x5 ea          Exercise Diary  9/5 9/10 9/13 9/18 9/20 9/24 9/26 9/30 10/3    Standing GA stretch 15"x3 15"x3 15"x3 15":x3 15"x3 15"x3 15"x3      DL ecc GA heelraises  3x10 3x10 3x10 3x10  DL/ SL con/ecc  3x10      Standing ecc knee flexion  2x10 2x10 3x10 3x10        Supine HA stretch with knee block 15"x3 15"x3 15"x3 15"x3 15"x3 15"x3 15"x3 15"x3 15"x3    Supine heelslides 5"x10 5"x20 5"x20 5"x20  5"x20 5"x20      Quad sets  5"x5 5"x20 5"x20 5"x20  5"x20 5"x20      DLS: SLR 4X15 7M21 9Y40 3x25         SL hip ABD  3x20 3x25 3x25    1#/ 3x20     Lateral ecc step-downs    4"/ 2x10 4"/ 1x10, 6" /2x10 6"/ 3x10 8"/  3x10 6"/ 1x10, 8"/ 2x10 6"/ 1x10, 8"/ 3x10    Leg Press machine : SL      50#/ 3x10 60#/ 3x10 60#  3x12 70#/ 3x10 80#/ 3x10    Supine ITB stretch        15"x3 15"x3    Side stepping with mini-squat        G/ 2x50 ft ea G/ 2x50 ft ea    bike        L1 5 min L1 5 min Modalities

## 2019-10-07 ENCOUNTER — OFFICE VISIT (OUTPATIENT)
Dept: PHYSICAL THERAPY | Facility: CLINIC | Age: 44
End: 2019-10-07
Payer: COMMERCIAL

## 2019-10-07 DIAGNOSIS — M25.562 ACUTE PAIN OF LEFT KNEE: Primary | ICD-10-CM

## 2019-10-07 PROCEDURE — 97112 NEUROMUSCULAR REEDUCATION: CPT | Performed by: PHYSICAL THERAPIST

## 2019-10-07 PROCEDURE — 97110 THERAPEUTIC EXERCISES: CPT | Performed by: PHYSICAL THERAPIST

## 2019-10-07 PROCEDURE — 97140 MANUAL THERAPY 1/> REGIONS: CPT | Performed by: PHYSICAL THERAPIST

## 2019-10-07 NOTE — PROGRESS NOTES
Daily Note     Today's date: 10/7/2019  Patient name: Frandy Rodgers  : 1975  MRN: [id number]  Referring provider: Self, Referral  Dx:   Encounter Diagnosis     ICD-10-CM    1  Acute pain of left knee M25 562                   Subjective: Pt reports less crepitus with stairs  Objective: See treatment diary below  Edema: trace  Assessment: Pt demonstrated improved patella tracking  Plan: Cont POC  Precautions: none  Dx: L PFPS      Manual  9/5 9/10 9/13 9/18 9/20 9/24 9/26 9/30 10/3 10/7   Graston semitendinosus 5 min 5 min 5 min 3 min 3 min 3 min 3 min      Graston distal lateral quad 3 min 3 min 3 min 5 min 5 min 5 min 4 min 4 min  4 min   Laser semitendinosus/ medial capsule 2000J 3300J 3000J  4000J np np      Laser patella 2000J 3300J 3000J 4000J 5000J 5500J 5500J 5500J 5500J 5500J   Medial patella taping      3 min 3 min 3 min 3 min 3 min   ITB foam roller        3 min     L knee PROM  5"x5 ea 5"x5 ea   5"x5 ea 5"x5 ea          Exercise Diary  9/5 9/10 9/13 9/18 9/20 9/24 9/26 9/30 10/3 10/7   Standing GA stretch 15"x3 15"x3 15"x3 15":x3 15"x3 15"x3 15"x3      DL ecc GA heelraises  3x10 3x10 3x10 3x10  DL/ SL con/ecc  3x10      Standing ecc knee flexion  2x10 2x10 3x10 3x10        Supine HA stretch with knee block 15"x3 15"x3 15"x3 15"x3 15"x3 15"x3 15"x3 15"x3 15"x3 15"x3   Supine heelslides 5"x10 5"x20 5"x20 5"x20  5"x20 5"x20      Quad sets  5"x5 5"x20 5"x20 5"x20  5"x20 5"x20      DLS: SLR 3I06 6Z66 5E78 3x25         SL hip ABD  3x20 3x25 3x25    1#/ 3x20  3#/ 3x15   Lateral ecc step-downs    4"/ 2x10 4"/ 1x10, 6" /2x10 6"/ 3x10 8"/  3x10 6"/ 1x10, 8"/ 2x10 6"/ 1x10, 8"/ 3x10 8"/ 3x10   Leg Press machine : SL      50#/ 3x10 60#/ 3x10 60#  3x12 70#/ 3x10 80#/ 3x10 90#/ 3x10   Supine ITB stretch        15"x3 15"x3 15"x3   Side stepping with mini-squat        G/ 2x50 ft ea G/ 2x50 ft ea G/ 2x50 ft ea   bike        L1 5 min L1 5 min L1 5 min Modalities

## 2019-10-10 ENCOUNTER — OFFICE VISIT (OUTPATIENT)
Dept: PHYSICAL THERAPY | Facility: CLINIC | Age: 44
End: 2019-10-10
Payer: COMMERCIAL

## 2019-10-10 DIAGNOSIS — M25.562 ACUTE PAIN OF LEFT KNEE: Primary | ICD-10-CM

## 2019-10-10 PROCEDURE — 97112 NEUROMUSCULAR REEDUCATION: CPT | Performed by: PHYSICAL THERAPIST

## 2019-10-10 PROCEDURE — 97140 MANUAL THERAPY 1/> REGIONS: CPT | Performed by: PHYSICAL THERAPIST

## 2019-10-10 PROCEDURE — 97110 THERAPEUTIC EXERCISES: CPT | Performed by: PHYSICAL THERAPIST

## 2019-10-10 NOTE — PROGRESS NOTES
Daily Note     Today's date: 10/10/2019  Patient name: Colton Headings  : 1975  MRN: [id number]  Referring provider: Self, Referral  Dx:   Encounter Diagnosis     ICD-10-CM    1  Acute pain of left knee M25 562                   Subjective: Pt reports no L knee pain with walking or squatting, occasional pain with descending stairs first thing in the morning  Objective: See treatment diary below  Assessment: Pt demonstrated L > R instability with SL bridges  Plan: Cont POC  RE NV, introduce running in Alter G  Precautions: none  Dx: L PFPS      Manual  10/10                         Graston distal lateral quad 3 min                         Laser patella 5000J            Medial patella taping 3 min                             Exercise Diary  10/10 9/10 9/13 9/18 9/20 9/24 9/26 9/30 10/3 10/7   bike 5 min L2            Lateral ecc step-downs 8"/ 3x10            SL leg Press Machine 100#/ 3x10            DL squats 1x15            Side stepping with mini-squat G/ 2x50            SL bridges 2x10            ITB stretch 15"x3            Alter G running                                                                                                                                                                                          Modalities

## 2019-10-14 ENCOUNTER — OFFICE VISIT (OUTPATIENT)
Dept: PHYSICAL THERAPY | Facility: CLINIC | Age: 44
End: 2019-10-14
Payer: COMMERCIAL

## 2019-10-14 DIAGNOSIS — M25.562 ACUTE PAIN OF LEFT KNEE: Primary | ICD-10-CM

## 2019-10-14 PROCEDURE — 97140 MANUAL THERAPY 1/> REGIONS: CPT | Performed by: PHYSICAL THERAPIST

## 2019-10-14 PROCEDURE — 97530 THERAPEUTIC ACTIVITIES: CPT | Performed by: PHYSICAL THERAPIST

## 2019-10-14 PROCEDURE — 97110 THERAPEUTIC EXERCISES: CPT | Performed by: PHYSICAL THERAPIST

## 2019-10-14 PROCEDURE — 97116 GAIT TRAINING THERAPY: CPT | Performed by: PHYSICAL THERAPIST

## 2019-10-14 NOTE — PROGRESS NOTES
PT Re-Evaluation     Today's date: 10/14/2019  Patient name: Brenda Fenton  : 1975  MRN: [id number]  Referring provider: Self, Referral  Dx:   Encounter Diagnosis     ICD-10-CM    1  Acute pain of left knee M25 562                   Assessment  Assessment details: Pt demonstrated normalized ROM, improved hip and quad strength, edema, pain and function  Pt will benefit from 2-3 more weeks of PT to return to running, and further improve stair tolerance  Impairments: activity intolerance, impaired physical strength and pain with function    Goals  ST-2 weeks  1   Pt will decrease pain > 50%  met  2  Pt will restore normalized ROM  met    LT-4 weeks  1   Pt will decrease pain > 75%  met  2  Pt will be able sit an unlimited time without pain  Met  3  Pt will resolve pain with stairs  4   Pt will return to running without pain as required for his job as a   Plan  Planned modality interventions: low level laser therapy  Planned therapy interventions: manual therapy, patient education, body mechanics training, strengthening, stretching, therapeutic activities, therapeutic exercise, therapeutic training, flexibility, functional ROM exercises, gait training, home exercise program and neuromuscular re-education  Frequency: 2x week  Duration in weeks: 3        Subjective Evaluation    History of Present Illness  Date of onset: 2019  Mechanism of injury: Pt reports a 90% improvement in L knee pain since IE  Pt reports occasional L anterior knee pain with descending stairs    Pain  Current pain ratin  At best pain ratin  At worst pain rating: 3  Quality: dull ache and sharp  Relieving factors: ice, rest and change in position  Aggravating factors: sitting and stair climbing      Diagnostic Tests  No diagnostic tests performed  Patient Goals  Patient goals for therapy: decreased edema, decreased pain, increased motion, increased strength, independence with ADLs/IADLs and return to sport/leisure activities          Objective     Passive Range of Motion   Left Knee   Flexion: 130 degrees   Extension: -4 degrees     Right Knee   Flexion: 130 degrees   Extension: -2 degrees     Mobility   Patellar Mobility:   Left Knee   WFL: lateral and superior  Hypomobile: left medial and left inferior    Right Knee   WFL: medial, lateral, superior and inferior    Strength/Myotome Testing     Left Hip   Planes of Motion   Flexion: 5  Extension: 5  Abduction: 4+  Adduction: 5    Right Hip   Planes of Motion   Flexion: 5  Extension: 5  Abduction: 5  Adduction: 5    Left Knee   Flexion: 5  Extension: 4+  Quadriceps contraction: good    Right Knee   Flexion: 5  Extension: 5  Quadriceps contraction: good    Additional Strength Details  Gait: normalized gait pattern  Edema: trace L knee  Runnin% BW x 8 min  DL squattin deg  Step-down test: 70 deg  Tests     Left Knee   Negative anterior drawer, anterior Lachman, lateral Nigel, medial Nigel, patellar apprehension, patellar compression, patella-femoral grind, posterior drawer, valgus stress test at 0 degrees, valgus stress test at 30 degrees, varus stress test at 0 degrees and varus stress test at 30 degrees  Precautions: none    Dx: L PFPS      Manual  10/10  10/14                                           Graston distal lateral quad 3 min  3 min                                           Laser patella 5000J  5000J                   Medial patella taping 3 min  3 min                                                 Exercise Diary  10/10 10/14           bike 5 min L2 5 min L2                   Lateral ecc step-downs 8"/ 3x10  8"/ 3x12                   SL leg Press Machine 100#/ 3x10  100#/ 3x12                   DL squats 1x15  1x15                   Side stepping with mini-squat G/ 2x50                     SL bridges 2x10                     ITB stretch 15"x3                     Alter G running    75% BW/ 10 min x 5 0 mph                    Prostretch   15"x3                    Standing GA stretch   15"x3

## 2019-10-14 NOTE — LETTER
2019    Brenna Quintanilla DO  [address]    Patient: Danai Bradley   YOB: 1975   Date of Visit: 10/14/2019     Encounter Diagnosis     ICD-10-CM    1  Acute pain of left knee M25 562        Dear Dr Morris Pert:    Thank you for your recent referral of Dania Bradley  Please review the attached evaluation summary from [facility] recent visit  Please verify that you agree with the plan of care by signing the attached order  If you have any questions or concerns, please do not hesitate to call  I sincerely appreciate the opportunity to share in the care of one of your patients and hope to have another opportunity to work with you in the near future  Sincerely,    Star Long, PT      Referring Provider:      I certify that I have read the below Plan of Care and certify the need for these services furnished under this plan of treatment while under my care  Brenna Quintanilla DO  [address]  [address] [facility] [place]          PT Re-Evaluation     Today's date: 10/14/2019  Patient name: Dania Bradley  : 1975  MRN: [id number]  Referring provider: Self, Referral  Dx:   Encounter Diagnosis     ICD-10-CM    1  Acute pain of left knee M25 562                   Assessment  Assessment details: Pt demonstrated normalized ROM, improved hip and quad strength, edema, pain and function  Pt will benefit from 2-3 more weeks of PT to return to running, and further improve stair tolerance  Impairments: activity intolerance, impaired physical strength and pain with function    Goals  ST-2 weeks  1   Pt will decrease pain > 50%  met  2  Pt will restore normalized ROM  met    LT-4 weeks  1   Pt will decrease pain > 75%  met  2  Pt will be able sit an unlimited time without pain  Met  3  Pt will resolve pain with stairs    4   Pt will return to running without pain as required for his job as a   Plan  Planned modality interventions: low level laser therapy  Planned therapy interventions: manual therapy, patient education, body mechanics training, strengthening, stretching, therapeutic activities, therapeutic exercise, therapeutic training, flexibility, functional ROM exercises, gait training, home exercise program and neuromuscular re-education  Frequency: 2x week  Duration in weeks: 3        Subjective Evaluation    History of Present Illness  Date of onset: 2019  Mechanism of injury: Pt reports a 90% improvement in L knee pain since IE  Pt reports occasional L anterior knee pain with descending stairs  Pain  Current pain ratin  At best pain ratin  At worst pain rating: 3  Quality: dull ache and sharp  Relieving factors: ice, rest and change in position  Aggravating factors: sitting and stair climbing      Diagnostic Tests  No diagnostic tests performed  Patient Goals  Patient goals for therapy: decreased edema, decreased pain, increased motion, increased strength, independence with ADLs/IADLs and return to sport/leisure activities          Objective     Passive Range of Motion   Left Knee   Flexion: 130 degrees   Extension: -4 degrees     Right Knee   Flexion: 130 degrees   Extension: -2 degrees     Mobility   Patellar Mobility:   Left Knee   WFL: lateral and superior  Hypomobile: left medial and left inferior    Right Knee   WFL: medial, lateral, superior and inferior    Strength/Myotome Testing     Left Hip   Planes of Motion   Flexion: 5  Extension: 5  Abduction: 4+  Adduction: 5    Right Hip   Planes of Motion   Flexion: 5  Extension: 5  Abduction: 5  Adduction: 5    Left Knee   Flexion: 5  Extension: 4+  Quadriceps contraction: good    Right Knee   Flexion: 5  Extension: 5  Quadriceps contraction: good    Additional Strength Details  Gait: normalized gait pattern  Edema: trace L knee  Runnin% BW x 8 min  DL squattin deg    Step-down test: 70 deg     Tests     Left Knee   Negative anterior drawer, anterior Lachman, lateral Nigel, medial Nigel, patellar apprehension, patellar compression, patella-femoral grind, posterior drawer, valgus stress test at 0 degrees, valgus stress test at 30 degrees, varus stress test at 0 degrees and varus stress test at 30 degrees  Precautions: none    Dx: L PFPS      Manual  10/10  10/14                                           Graston distal lateral quad 3 min  3 min                                           Laser patella 5000J  5000J                   Medial patella taping 3 min  3 min                                                 Exercise Diary  10/10 10/14           bike 5 min L2 5 min L2                   Lateral ecc step-downs 8"/ 3x10  8"/ 3x12                   SL leg Press Machine 100#/ 3x10  100#/ 3x12                   DL squats 1x15  1x15                   Side stepping with mini-squat G/ 2x50                     SL bridges 2x10                     ITB stretch 15"x3                     Alter G running    75% BW/ 10 min x 5 0 mph                    Prostretch   15"x3                    Standing GA stretch   15"x3

## 2019-10-17 ENCOUNTER — OFFICE VISIT (OUTPATIENT)
Dept: PHYSICAL THERAPY | Facility: CLINIC | Age: 44
End: 2019-10-17
Payer: COMMERCIAL

## 2019-10-17 DIAGNOSIS — M25.562 ACUTE PAIN OF LEFT KNEE: Primary | ICD-10-CM

## 2019-10-17 PROCEDURE — 97110 THERAPEUTIC EXERCISES: CPT | Performed by: PHYSICAL THERAPIST

## 2019-10-17 PROCEDURE — 97530 THERAPEUTIC ACTIVITIES: CPT | Performed by: PHYSICAL THERAPIST

## 2019-10-17 PROCEDURE — 97140 MANUAL THERAPY 1/> REGIONS: CPT | Performed by: PHYSICAL THERAPIST

## 2019-10-17 PROCEDURE — 97116 GAIT TRAINING THERAPY: CPT | Performed by: PHYSICAL THERAPIST

## 2019-10-17 NOTE — PROGRESS NOTES
Daily Note     Today's date: 10/17/2019  Patient name: Lynsey Schultz  : 1975  MRN: [id number]  Referring provider: Self, Referral  Dx:   Encounter Diagnosis     ICD-10-CM    1  Acute pain of left knee M25 562                   Subjective:   Pt reports mild stiffness around the lateral joint line of his L knee during and after running  Pt denies pain  Objective: See treatment diary below  Edema: 1+  Assessment: Pt demonstrated improved quad strength and good running tolerance to 85% BW on Alter G  Plan:  Assess response to running at 85% BW and Elliptical over the weekend  Precautions: none    Dx: L PFPS      Manual  10/10  10/14  10/17                                         Graston distal lateral quad 3 min  3 min  3 min                                         Laser patella 5000J  5000J  5000J                 Medial patella taping 3 min  3 min  3 min                                               Exercise Diary  10/10 10/14 10/17          bike 5 min L2 5 min L2  5 min L2                 Lateral ecc step-downs 8"/ 3x10  8"/ 3x12  8"/ 3x12                 SL leg Press Machine 100#/ 3x10  100#/ 3x12  100#/ 3x12                 DL squats 1x15  1x15  1x15                 Side stepping with mini-squat G/ 2x50    G/ 3x50 ft                 SL bridges 2x10                     ITB stretch 15"x3                     Alter G running    75% BW/ 10 min x 5 0 mph  75-85% BW 10 min x 5 mph                  Prostretch   15"x3  15"x3                  Standing GA stretch   15"x3

## 2019-10-21 ENCOUNTER — OFFICE VISIT (OUTPATIENT)
Dept: PHYSICAL THERAPY | Facility: CLINIC | Age: 44
End: 2019-10-21
Payer: COMMERCIAL

## 2019-10-21 DIAGNOSIS — M25.562 ACUTE PAIN OF LEFT KNEE: Primary | ICD-10-CM

## 2019-10-21 PROCEDURE — 97110 THERAPEUTIC EXERCISES: CPT

## 2019-10-21 PROCEDURE — 97140 MANUAL THERAPY 1/> REGIONS: CPT

## 2019-10-21 NOTE — PROGRESS NOTES
Daily Note     Today's date: 10/21/2019  Patient name: Carmen Rodriguez  : 1975  MRN: [id number]  Referring provider: Self, Referral  Dx:   Encounter Diagnosis     ICD-10-CM    1  Acute pain of left knee M25 562                   Subjective:   Pt reports left knee pain during Alter G running progression last tx session  Pt reports no pain afterwards and no pain with using elliptical machine for 30 minutes over the weekend  Objective: See treatment diary below  Assessment: Pt demonstrated decreased frequency and intensity of pain with Alter G running at 85% BW when using visual feedback to adjust running mechanics  Plan:  Assess response to running at 85% nv  Precautions: none    Dx: L PFPS      Manual  10/10  10/14  10/17  10/21                                       Graston distal lateral quad 3 min  3 min  3 min  3 min                                       Laser patella 5000J  5000J  5000J  5000J               Medial patella taping 3 min  3 min  3 min  3 min                                             Exercise Diary  10/10 10/14 10/17 10/21         bike 5 min L2 5 min L2  5 min L2  5 min  L2               Lateral ecc step-downs 8"/ 3x10  8"/ 3x12  8"/ 3x12  8"/  3x15               SL leg Press Machine 100#/ 3x10  100#/ 3x12  100#/ 3x12  100#/  3x12               DL squats 1x15  1x15  1x15  np               Side stepping with mini-squat G/ 2x50    G/ 3x50 ft  G/  3x50 ft               SL bridges 2x10                     ITB stretch 15"x3                     Alter G running    75% BW/ 10 min x 5 0 mph  75-85% BW 10 min x 5 mph 75-85% BW 10 min x5 mph                Prostretch   15"x3  15"x3                  Standing GA stretch   15"x3    15"x3

## 2019-10-24 ENCOUNTER — OFFICE VISIT (OUTPATIENT)
Dept: PHYSICAL THERAPY | Facility: CLINIC | Age: 44
End: 2019-10-24
Payer: COMMERCIAL

## 2019-10-24 DIAGNOSIS — M25.562 ACUTE PAIN OF LEFT KNEE: Primary | ICD-10-CM

## 2019-10-24 PROCEDURE — 97530 THERAPEUTIC ACTIVITIES: CPT | Performed by: PHYSICAL THERAPIST

## 2019-10-24 PROCEDURE — 97110 THERAPEUTIC EXERCISES: CPT | Performed by: PHYSICAL THERAPIST

## 2019-10-24 PROCEDURE — 97116 GAIT TRAINING THERAPY: CPT | Performed by: PHYSICAL THERAPIST

## 2019-10-24 PROCEDURE — 97140 MANUAL THERAPY 1/> REGIONS: CPT | Performed by: PHYSICAL THERAPIST

## 2019-10-24 NOTE — PROGRESS NOTES
Daily Note     Today's date: 10/24/2019  Patient name: Annabelle Garcia  : 1975  MRN: [id number]  Referring provider: Self, Referral  Dx:   Encounter Diagnosis     ICD-10-CM    1  Acute pain of left knee M25 562                   Subjective:   Pt reports min L knee pain after running for several minutes, no pain with ADLs, work or the Elliptical   Pt requests d/c     Objective: See treatment diary below  Instructed pt to perform medial patella taping prior to running and gave HEP instructions  Assessment: Pt is appropriate for d/c to HEP  Pt was instructed to continue progressing HEP for 2 weeks, then begin running at home for 1 mi and progressively increase distance  Plan:  D/c to HEP  Precautions: none    Dx: L PFPS      Manual  10/10  10/14  10/17  10/21  10/24                                     Graston distal lateral quad 3 min  3 min  3 min  3 min  3 min                                     Laser patella 5000J  5000J  5000J  5000J  5000J             Medial patella taping 3 min  3 min  3 min  3 min  3 min              ITB foam roller         3 min                   Exercise Diary  10/10 10/14 10/17 10/21 10/24        bike 5 min L2 5 min L2  5 min L2  5 min  L2  5 min L2             Lateral ecc step-downs 8"/ 3x10  8"/ 3x12  8"/ 3x12  8"/  3x15  8"/ 3x15             SL leg Press Machine 100#/ 3x10  100#/ 3x12  100#/ 3x12  100#/  3x12  110#/ 3x10             DL squats 1x15  1x15  1x15  np               Side stepping with mini-squat G/ 2x50    G/ 3x50 ft  G/  3x50 ft  G/ 3x50 ft             SL bridges 2x10                     ITB stretch 15"x3        15"x3             Alter G running    75% BW/ 10 min x 5 0 mph  75-85% BW 10 min x 5 mph 75-85% BW 10 min x5 mph  90% BW 10 min x 5 mph              Prostretch   15"x3  15"x3                  Standing GA stretch   15"x3    15"x3  15"x3

## 2020-07-27 ENCOUNTER — EVALUATION (OUTPATIENT)
Dept: PHYSICAL THERAPY | Facility: CLINIC | Age: 45
End: 2020-07-27
Payer: COMMERCIAL

## 2020-07-27 DIAGNOSIS — M25.532 LEFT WRIST PAIN: Primary | ICD-10-CM

## 2020-07-27 PROCEDURE — 97112 NEUROMUSCULAR REEDUCATION: CPT | Performed by: PHYSICAL THERAPIST

## 2020-07-27 PROCEDURE — 97140 MANUAL THERAPY 1/> REGIONS: CPT | Performed by: PHYSICAL THERAPIST

## 2020-07-27 PROCEDURE — 97161 PT EVAL LOW COMPLEX 20 MIN: CPT | Performed by: PHYSICAL THERAPIST

## 2020-07-27 NOTE — PROGRESS NOTES
PT Evaluation     Today's date: 2020  Patient name: Patricia Wilson  : 1975  MRN: [id number]  Referring provider: Self, Referral  Dx:   Encounter Diagnosis     ICD-10-CM    1  Left wrist pain M25 532                   Assessment  Assessment details: Pt presents with s/s consistent with L carpal tunnel syndrome  Pt will benefit from PT to address impairments and restore function  Pt will utilize direct access benefits and will be referred to PCP if sx do not resolve within 30 days  Impairments: activity intolerance, impaired physical strength, lacks appropriate home exercise program and pain with function    Goals  ST weeks  1   Pt will decrease pain > 50%  2   Pt will normalize median nerve tension  LT weeks  1   Pt will decrease pain > 90%  2   Pt will increase L  strength > 80#  Plan  Planned modality interventions: low level laser therapy  Planned therapy interventions: joint mobilization, manual therapy, neuromuscular re-education, body mechanics training, strengthening, stretching, therapeutic activities, therapeutic exercise, functional ROM exercises and home exercise program  Frequency: 2x week  Duration in weeks: 3        Subjective Evaluation    History of Present Illness  Date of onset: 2020  Mechanism of injury: Pt is a 50 yom c/o an insidious onset of L wrist and L lateral elbow pain that began 2 weeks ago  Pt denies falls    Pain  Current pain ratin  At best pain rating: 3  At worst pain ratin  Quality: sharp      Diagnostic Tests  No diagnostic tests performed  Patient Goals  Patient goals for therapy: decreased pain, decreased edema, increased strength, independence with ADLs/IADLs and return to sport/leisure activities          Objective     Active Range of Motion     Left Elbow   Flexion: WFL  Extension: WFL  Forearm supination: 80 degrees with pain  Forearm pronation: 80 degrees with pain    Left Wrist   Wrist flexion: WFL  Wrist extension: WFL  Radial deviation: WFL  Ulnar deviation: [facility]      Joint Play     Left Elbow   Joints within functional limits are the humeroulnar joint, humeroradial joint and proximal radioulnar joint  Hypomobile in the distal radioulnar joint  Strength/Myotome Testing     Left Elbow   Flexion: 5  Extension: 5  Forearm supination: 4+  Forearm pronation: 4+    Left Wrist/Hand   Wrist extension: 5  Wrist flexion: 5     (2nd hand position)     Trial 1: 40    Trial 2: 47    Comments: Pt is R hand dominant  7# increase in  strength with taping  Tests     Left Shoulder   Positive ULTT1  Left Elbow   Negative Cozen's, Mill's and Tinel's sign (cubital tunnel)  Left Wrist/Hand   Positive Tinel's sign (medial nerve)  Negative Finkelstein's and resisted middle finger  Precautions: none  Dx: L carpal tunnel syndrome        Manuals 7/27            Laser L carpal tunnel             L post radial MWM with sup 2x10            L post distal radius taping 3 min                         Neuro Re-Ed             Median nerve flossing 1x10            Carpal tunnel flossing 1x10                                                                             Ther Ex             ecc wrist ext  1#/ 3x10           Wrist flex  1#/ 3x10                                                                                         Ther Activity                                       Gait Training                                       Modalities

## 2020-07-30 ENCOUNTER — OFFICE VISIT (OUTPATIENT)
Dept: PHYSICAL THERAPY | Facility: CLINIC | Age: 45
End: 2020-07-30
Payer: COMMERCIAL

## 2020-07-30 DIAGNOSIS — M25.532 LEFT WRIST PAIN: Primary | ICD-10-CM

## 2020-07-30 PROCEDURE — 97112 NEUROMUSCULAR REEDUCATION: CPT | Performed by: PHYSICAL THERAPIST

## 2020-07-30 PROCEDURE — 97140 MANUAL THERAPY 1/> REGIONS: CPT | Performed by: PHYSICAL THERAPIST

## 2020-07-30 PROCEDURE — 97110 THERAPEUTIC EXERCISES: CPT | Performed by: PHYSICAL THERAPIST

## 2020-07-30 NOTE — PROGRESS NOTES
Daily Note     Today's date: 2020  Patient name: Gilberto Miller  : 1975  MRN: [id number]  Referring provider: Self, Referral  Dx:   Encounter Diagnosis     ICD-10-CM    1  Left wrist pain M25 532        Start Time: 1500  Stop Time: 1530  Total time in clinic (min): 30 minutes    Subjective: Pt reports relief from L wrist pain with tape during IE that lasted through the night  He reports 6/10 wrist pain at start of today's session      Objective: See treatment diary below      Assessment: Pt demonstrated improved ROM and gripping following MWM and taping  Plan: Assess response to tx  Precautions: none  Dx: L carpal tunnel syndrome        Manuals            Laser L carpal tunnel  4000J           L post radial MWM with sup 2x10 2x10           L post distal radius taping 3 min 3 min                        Neuro Re-Ed             Median nerve flossing 1x10 1x10           Carpal tunnel flossing 1x10 1x10                                                                            Ther Ex             ecc wrist ext  1#/ 3x10           Wrist flex  1#/ 3x10           Digiflex  Y/ 3x10                                                                            Ther Activity                                       Gait Training                                       Modalities

## 2020-08-05 ENCOUNTER — OFFICE VISIT (OUTPATIENT)
Dept: PHYSICAL THERAPY | Facility: CLINIC | Age: 45
End: 2020-08-05
Payer: COMMERCIAL

## 2020-08-05 DIAGNOSIS — M25.532 LEFT WRIST PAIN: Primary | ICD-10-CM

## 2020-08-05 PROCEDURE — 97110 THERAPEUTIC EXERCISES: CPT | Performed by: PHYSICAL THERAPIST

## 2020-08-05 PROCEDURE — 97112 NEUROMUSCULAR REEDUCATION: CPT | Performed by: PHYSICAL THERAPIST

## 2020-08-05 PROCEDURE — 97140 MANUAL THERAPY 1/> REGIONS: CPT | Performed by: PHYSICAL THERAPIST

## 2020-08-05 NOTE — PROGRESS NOTES
Daily Note     Today's date: 2020  Patient name: Christie Kenny  : 1975  MRN: [id number]  Referring provider: Self, Referral  Dx:   Encounter Diagnosis     ICD-10-CM    1  Left wrist pain  M25 532                   Subjective: Pt reports improved pain, but notices thumb ext restriction  Objective: See treatment diary below  Added thumb ext stretch  Assessment: Pt demonstrated mild edema around MTP, improved pain-free supination ROM  Plan: Assess response to tx  Precautions: none  Dx: L carpal tunnel syndrome        Manuals           Laser L carpal tunnel  4000J 4000J          L post radial MWM with sup 2x10 2x10 3x10          L post distal radius taping 3 min 3 min 3 min                       Neuro Re-Ed             Median nerve flossing 1x10 1x10 1x10          Carpal tunnel flossing 1x10 1x10 1x10                                                                           Ther Ex             ecc wrist ext  1#/ 3x10 2#/ 3x10          Wrist flex  1#/ 3x10 2#/ 3x10          Digiflex  Y/ 3x10 Y/ 3x12          MTP ext stretch   10"x5                                                              Ther Activity                                       Gait Training                                       Modalities

## 2020-08-07 ENCOUNTER — OFFICE VISIT (OUTPATIENT)
Dept: PHYSICAL THERAPY | Facility: CLINIC | Age: 45
End: 2020-08-07
Payer: COMMERCIAL

## 2020-08-07 DIAGNOSIS — M25.532 LEFT WRIST PAIN: Primary | ICD-10-CM

## 2020-08-07 PROCEDURE — 97112 NEUROMUSCULAR REEDUCATION: CPT | Performed by: PHYSICAL THERAPIST

## 2020-08-07 PROCEDURE — 97140 MANUAL THERAPY 1/> REGIONS: CPT | Performed by: PHYSICAL THERAPIST

## 2020-08-07 PROCEDURE — 97110 THERAPEUTIC EXERCISES: CPT | Performed by: PHYSICAL THERAPIST

## 2020-08-07 NOTE — PROGRESS NOTES
Daily Note     Today's date: 2020  Patient name: Korey Pitts  : 1975  MRN: [id number]  Referring provider: Self, Referral  Dx:   No diagnosis found  Subjective: Pt reports improved pain levels but 7/10 pain with gripping and supination  Objective: See treatment diary below  Assessment: Pt demonstrated pain-free tolerance to strengthening progressions  Plan: Assess response to tx  Precautions: none  Dx: L carpal tunnel syndrome        Manuals          Laser L carpal tunnel  4000J 4000J 4000J         L post radial MWM with sup 2x10 2x10 3x10 3x10         L post distal radius taping 3 min 3 min 3 min 3 min                      Neuro Re-Ed             Median nerve flossing 1x10 1x10 1x10 1x10         Carpal tunnel flossing 1x10 1x10 1x10 1x10                                                                          Ther Ex             ecc wrist ext  1#/ 3x10 2#/ 3x10 2#/ 3x12         Wrist flex  1#/ 3x10 2#/ 3x10 2#/ 3x12         Digiflex  Y/ 3x10 Y/ 3x12 Y/ 3x12         MTP ext stretch   10"x5 10"x5                                                             Ther Activity                                       Gait Training                                       Modalities

## 2020-08-10 ENCOUNTER — OFFICE VISIT (OUTPATIENT)
Dept: PHYSICAL THERAPY | Facility: CLINIC | Age: 45
End: 2020-08-10
Payer: COMMERCIAL

## 2020-08-10 DIAGNOSIS — M25.532 LEFT WRIST PAIN: Primary | ICD-10-CM

## 2020-08-10 PROCEDURE — 97112 NEUROMUSCULAR REEDUCATION: CPT | Performed by: PHYSICAL THERAPIST

## 2020-08-10 PROCEDURE — 97110 THERAPEUTIC EXERCISES: CPT | Performed by: PHYSICAL THERAPIST

## 2020-08-10 PROCEDURE — 97140 MANUAL THERAPY 1/> REGIONS: CPT | Performed by: PHYSICAL THERAPIST

## 2020-08-10 NOTE — PROGRESS NOTES
Daily Note     Today's date: 8/10/2020  Patient name: Jhony Smalls  : 1975  MRN: [id number]  Referring provider: Self, Referral  Dx:   Encounter Diagnosis     ICD-10-CM    1  Left wrist pain  M25 532                   Subjective: Pt reports improved motion and pain in elbow and with thumb opposition  Objective: See treatment diary below  L  strength: 54#  14# increase in 10 days, R: 105#  Assessment: Pt demonstrated improved  strength, added MTP MWM to assist with pain-free opposition  Held taping secondary to full, pain-free supination  Plan: Cont POC  Precautions: none  Dx: L carpal tunnel syndrome        Manuals 7/27 7/30 8/5 8/7 8/10        Laser L carpal tunnel  4000J 4000J 4000J 4000J        L post radial MWM with sup 2x10 2x10 3x10 3x10 2x10        L post distal radius taping 3 min 3 min 3 min 3 min         1st MTP flex with lateral glide MWM     2x10        [facility]              Neuro Re-Ed             Median nerve flossing 1x10 1x10 1x10 1x10 1x10        Carpal tunnel flossing 1x10 1x10 1x10 1x10 1x10                                                                         Ther Ex             ecc wrist ext  1#/ 3x10 2#/ 3x10 2#/ 3x12 3#/ 3x10        Wrist flex  1#/ 3x10 2#/ 3x10 2#/ 3x12 3#/ 3x10        Resisted supination/pronation             Digiflex  Y/ 3x10 Y/ 3x12 Y/ 3x12 Y/ 3x15        MTP ext stretch   10"x5 10"x5 10"x5                                                            Ther Activity                                       Gait Training                                       Modalities

## 2020-08-13 ENCOUNTER — OFFICE VISIT (OUTPATIENT)
Dept: PHYSICAL THERAPY | Facility: CLINIC | Age: 45
End: 2020-08-13
Payer: COMMERCIAL

## 2020-08-13 DIAGNOSIS — M25.532 LEFT WRIST PAIN: Primary | ICD-10-CM

## 2020-08-13 PROCEDURE — 97112 NEUROMUSCULAR REEDUCATION: CPT | Performed by: PHYSICAL THERAPIST

## 2020-08-13 PROCEDURE — 97110 THERAPEUTIC EXERCISES: CPT | Performed by: PHYSICAL THERAPIST

## 2020-08-13 PROCEDURE — 97140 MANUAL THERAPY 1/> REGIONS: CPT | Performed by: PHYSICAL THERAPIST

## 2020-08-13 NOTE — PROGRESS NOTES
Daily Note     Today's date: 2020  Patient name: Shalonda Robbins  : 1975  MRN: [id number]  Referring provider: Self, Referral  Dx:   Encounter Diagnosis     ICD-10-CM    1  Left wrist pain  M25 532                   Subjective: Pt reports improved pain and ROM x 2 days following last treatment but waking up with increased L thumb stiffness > pain today  Objective: See treatment diary below  Assessment: Pt unable to oppose L thumb to 5th finger prior to MT but able to reach 5th DIP following MWMs  Pt demonstrated mildly limited sup ROM so taping was resumed  Plan: Assess response NV  Precautions: none  Dx: L carpal tunnel syndrome        Manuals 7/27 7/30 8/5 8/7 8/10 8/13       Laser L carpal tunnel  4000J 4000J 4000J 4000J 4000J       L post radial MWM with sup 2x10 2x10 3x10 3x10 2x10 2x10       L post distal radius taping 3 min 3 min 3 min 3 min  2 min       1st MTP flex with lateral glide MWM     2x10 MTP  3x10  CMC  2x10       Graston              Neuro Re-Ed             Median nerve flossing 1x10 1x10 1x10 1x10 1x10 1x10       Carpal tunnel flossing 1x10 1x10 1x10 1x10 1x10 1x10                                                                        Ther Ex             ecc wrist ext  1#/ 3x10 2#/ 3x10 2#/ 3x12 3#/ 3x10 3#  3x10       Wrist flex  1#/ 3x10 2#/ 3x10 2#/ 3x12 3#/ 3x10 3#  3x10       Resisted supination/pronation             Digiflex  Y/ 3x10 Y/ 3x12 Y/ 3x12 Y/ 3x15 Y/  3x15       MTP ext stretch   10"x5 10"x5 10"x5 10"x5                                                           Ther Activity                                       Gait Training                                       Modalities

## 2020-08-18 ENCOUNTER — OFFICE VISIT (OUTPATIENT)
Dept: PHYSICAL THERAPY | Facility: CLINIC | Age: 45
End: 2020-08-18
Payer: COMMERCIAL

## 2020-08-18 DIAGNOSIS — M25.532 LEFT WRIST PAIN: Primary | ICD-10-CM

## 2020-08-18 PROCEDURE — 97112 NEUROMUSCULAR REEDUCATION: CPT | Performed by: PHYSICAL THERAPIST

## 2020-08-18 PROCEDURE — 97110 THERAPEUTIC EXERCISES: CPT | Performed by: PHYSICAL THERAPIST

## 2020-08-18 PROCEDURE — 97140 MANUAL THERAPY 1/> REGIONS: CPT | Performed by: PHYSICAL THERAPIST

## 2020-08-18 NOTE — PROGRESS NOTES
Daily Note     Today's date: 2020  Patient name: Patricia Wilson  : 1975  MRN: [id number]  Referring provider: Self, Referral  Dx:   No diagnosis found  Subjective: Pt reports improved thumb ROM but 5/10 pain at end-ranges  Objective: See treatment diary below  Assessment: Pt demonstrated good tolerance to progressions  Full thumb opposition after MWM again today  Supination WFL today  Held MWM and taping today  Plan: Assess response NV  Precautions: none  Dx: L carpal tunnel syndrome        Manuals 7/27 7/30 8/5 8/7 8/10 8/13 8/18      Laser L carpal tunnel  4000J 4000J 4000J 4000J 4000J 4050J      L post radial MWM with sup 2x10 2x10 3x10 3x10 2x10 2x10 np      L post distal radius taping 3 min 3 min 3 min 3 min  2 min np      1st MTP flex with lateral glide MWM     2x10 MTP  3x10  CMC  2x10 MTP 3x10 CMC 2x10      Graston              Neuro Re-Ed             Median nerve flossing 1x10 1x10 1x10 1x10 1x10 1x10 1x10      Carpal tunnel flossing 1x10 1x10 1x10 1x10 1x10 1x10 1x10                                                                       Ther Ex             ecc wrist ext  1#/ 3x10 2#/ 3x10 2#/ 3x12 3#/ 3x10 3#  3x10 3#/ 3x12      Wrist flex  1#/ 3x10 2#/ 3x10 2#/ 3x12 3#/ 3x10 3#  3x10 3#/ 3x12      Resisted supination/pronation             Digiflex  Y/ 3x10 Y/ 3x12 Y/ 3x12 Y/ 3x15 Y/  3x15 Y/ 3x18      MTP ext stretch   10"x5 10"x5 10"x5 10"x5 10"x5                                                          Ther Activity                                       Gait Training                                       Modalities

## 2020-08-26 ENCOUNTER — OFFICE VISIT (OUTPATIENT)
Dept: PHYSICAL THERAPY | Facility: CLINIC | Age: 45
End: 2020-08-26
Payer: COMMERCIAL

## 2020-08-26 DIAGNOSIS — M25.532 LEFT WRIST PAIN: Primary | ICD-10-CM

## 2020-08-26 PROCEDURE — 97140 MANUAL THERAPY 1/> REGIONS: CPT | Performed by: PHYSICAL THERAPIST

## 2020-08-26 PROCEDURE — 97110 THERAPEUTIC EXERCISES: CPT | Performed by: PHYSICAL THERAPIST

## 2020-08-26 PROCEDURE — 97112 NEUROMUSCULAR REEDUCATION: CPT | Performed by: PHYSICAL THERAPIST

## 2020-08-26 NOTE — PROGRESS NOTES
Daily Note     Today's date: 2020  Patient name: Juana Wolff  : 1975  MRN: [id number]  Referring provider: Self, Referral  Dx:   Encounter Diagnosis     ICD-10-CM    1  Left wrist pain  M25 532                   Subjective: Pt reports full, pain-free wrist motion, L thumb opposition tightness and pain that is gradually improving  Objective: See treatment diary below  Assessment: Switched focus to thumb CMC, MTP mobility and stability  Plan: Cont POC  Precautions: none  Dx: L carpal tunnel syndrome        Manuals 7/27 7/30 8/5 8/7 8/10 8/13 8/18 8/26     Laser L CMC, MTP  4000J 4000J 4000J 4000J 4000J 4050J 4000J                               1st MTP flex with lateral glide MWM     2x10 MTP  3x10  CMC  2x10 MTP 3x10 CMC 2x10 MTP 3x10 CMC 3x10     Graston APB        3 min     Neuro Re-Ed             Median nerve flossing 1x10 1x10 1x10 1x10 1x10 1x10 1x10 1x10     Carpal tunnel flossing 1x10 1x10 1x10 1x10 1x10 1x10 1x10 1x10                                                                      Ther Ex             ecc wrist ext  1#/ 3x10 2#/ 3x10 2#/ 3x12 3#/ 3x10 3#  3x10 3#/ 3x12 3#/ 3x12     Wrist flex  1#/ 3x10 2#/ 3x10 2#/ 3x12 3#/ 3x10 3#  3x10 3#/ 3x12 10#/ 3x10     Resisted supination/pronation        3#/ 3x10     Digiflex  Y/ 3x10 Y/ 3x12 Y/ 3x12 Y/ 3x15 Y/  3x15 Y/ 3x18 Y/ 3x20     MTP ext stretch   10"x5 10"x5 10"x5 10"x5 10"x5 10"x5     AROM thumb ext/flex        1x10     AROM thumb ABD/ADD        1x10     AROM with OP thumb ext        2"x10     TB thumb flex        Y/ 2x10      TB thumb ext        Y/ 2x10                  Ther Activity                                       Gait Training                                       Modalities

## 2020-08-28 ENCOUNTER — OFFICE VISIT (OUTPATIENT)
Dept: PHYSICAL THERAPY | Facility: CLINIC | Age: 45
End: 2020-08-28
Payer: COMMERCIAL

## 2020-08-28 DIAGNOSIS — M25.532 LEFT WRIST PAIN: Primary | ICD-10-CM

## 2020-08-28 PROCEDURE — 97110 THERAPEUTIC EXERCISES: CPT | Performed by: PHYSICAL THERAPIST

## 2020-08-28 PROCEDURE — 97112 NEUROMUSCULAR REEDUCATION: CPT | Performed by: PHYSICAL THERAPIST

## 2020-08-28 PROCEDURE — 97140 MANUAL THERAPY 1/> REGIONS: CPT | Performed by: PHYSICAL THERAPIST

## 2020-08-28 NOTE — PROGRESS NOTES
Daily Note     Today's date: 2020  Patient name: Alicja Linares  : 1975  MRN: [id number]  Referring provider: Self, Referral  Dx:   Encounter Diagnosis     ICD-10-CM    1  Left wrist pain  M25 532                   Subjective:  Pt reports stiffness in his L thumb, no pain currently, no soreness after last visit  Objective: See treatment diary below  Assessment: Pt demonstrated improved thumb hip ABD and opposition  Plan: Cont POC  Precautions: none  Dx: L carpal tunnel syndrome        Manuals 7/27 7/30 8/5 8/7 8/10 8/13 8/18 8/26 8/28    Laser L CMC, MTP  4000J 4000J 4000J 4000J 4000J 4050J 4000J 4000J                              1st MTP flex with lateral glide MWM     2x10 MTP  3x10  CMC  2x10 MTP 3x10 CMC 2x10 MTP 3x10 CMC 3x10 MTP 3x10, CMC 3x10    Graston APB        3 min 3 min    Neuro Re-Ed             Median nerve flossing 1x10 1x10 1x10 1x10 1x10 1x10 1x10 1x10 1x10    Carpal tunnel flossing 1x10 1x10 1x10 1x10 1x10 1x10 1x10 1x10 1x10                                                                     Ther Ex             ecc wrist ext  1#/ 3x10 2#/ 3x10 2#/ 3x12 3#/ 3x10 3#  3x10 3#/ 3x12 3#/ 3x12 3#/ 3x15    Wrist flex  1#/ 3x10 2#/ 3x10 2#/ 3x12 3#/ 3x10 3#  3x10 3#/ 3x12 10#/ 3x10 10#/ 3x15    Resisted supination/pronation        3#/ 3x10 3#/ 3x15    Digiflex  Y/ 3x10 Y/ 3x12 Y/ 3x12 Y/ 3x15 Y/  3x15 Y/ 3x18 Y/ 3x20 R/ 3x20    MTP ext stretch   10"x5 10"x5 10"x5 10"x5 10"x5 10"x5 10"x5    AROM thumb ext/flex        1x10 1x10    AROM thumb ABD/ADD        1x10 1x10    AROM with OP thumb ext        2"x10 2"x10    TB thumb flex        Y/ 2x10  R/ 3x10    TB thumb ext        Y/ 2x10 R/ 3x10    Thumb opposition AROM         1x5    Ther Activity                                       Gait Training                                       Modalities

## 2020-09-01 ENCOUNTER — OFFICE VISIT (OUTPATIENT)
Dept: PHYSICAL THERAPY | Facility: CLINIC | Age: 45
End: 2020-09-01
Payer: COMMERCIAL

## 2020-09-01 DIAGNOSIS — M25.532 LEFT WRIST PAIN: Primary | ICD-10-CM

## 2020-09-01 PROCEDURE — 97140 MANUAL THERAPY 1/> REGIONS: CPT | Performed by: PHYSICAL THERAPIST

## 2020-09-01 PROCEDURE — 97112 NEUROMUSCULAR REEDUCATION: CPT | Performed by: PHYSICAL THERAPIST

## 2020-09-01 PROCEDURE — 97110 THERAPEUTIC EXERCISES: CPT | Performed by: PHYSICAL THERAPIST

## 2020-09-01 NOTE — PROGRESS NOTES
Daily Note     Today's date: 2020  Patient name: Jd Bains  : 1975  MRN: [id number]  Referring provider: Self, Referral  Dx:   Encounter Diagnosis     ICD-10-CM    1  Left wrist pain  M25 532                   Subjective:  Pt reports mild stiffness with thumb opposition, no wrist pain  Objective: See treatment diary below  Assessment: Pt demonstrated MTP edema, mild opposition and ext ROM restrictions  Plan: Cont POC  Precautions: none  Dx: L carpal tunnel syndrome        Manuals 7/27 7/30 8/5 8/7 8/10 8/13 8/18 8/26 8/28 9/1   Laser L CMC, MTP  4000J 4000J 4000J 4000J 4000J 4050J 4000J 4000J 4000J                             1st MTP flex with lateral glide MWM     2x10 MTP  3x10  CMC  2x10 MTP 3x10 CMC 2x10 MTP 3x10 CMC 3x10 MTP 3x10, CMC 3x10 3x10 opposition   Graston APB        3 min 3 min 3 min   Neuro Re-Ed             Median nerve flossing 1x10 1x10 1x10 1x10 1x10 1x10 1x10 1x10 1x10 1x10   Carpal tunnel flossing 1x10 1x10 1x10 1x10 1x10 1x10 1x10 1x10 1x10 1x10                                                                    Ther Ex             ecc wrist ext  1#/ 3x10 2#/ 3x10 2#/ 3x12 3#/ 3x10 3#  3x10 3#/ 3x12 3#/ 3x12 3#/ 3x15 4#/ 3x10   Wrist flex  1#/ 3x10 2#/ 3x10 2#/ 3x12 3#/ 3x10 3#  3x10 3#/ 3x12 10#/ 3x10 10#/ 3x15 12 5# 3x10   Resisted supination/pronation        3#/ 3x10 3#/ 3x15 5#/ 3x15   Digiflex  Y/ 3x10 Y/ 3x12 Y/ 3x12 Y/ 3x15 Y/  3x15 Y/ 3x18 Y/ 3x20 R/ 3x20 G/ 3x20   MTP ext stretch   10"x5 10"x5 10"x5 10"x5 10"x5 10"x5 10"x5 10"x5   AROM thumb ext/flex        1x10 1x10 1x10   AROM thumb ABD/ADD        1x10 1x10 1x10   AROM with OP thumb ext        2"x10 2"x10 2"x10   TB thumb flex        Y/ 2x10  R/ 3x10 G/ 3x10   TB thumb ext        Y/ 2x10 R/ 3x10 G/ 3x10   Thumb opposition AROM with OP         1x5 1x10   Ther Activity                                       Gait Training                                       Modalities

## 2020-09-04 ENCOUNTER — OFFICE VISIT (OUTPATIENT)
Dept: PHYSICAL THERAPY | Facility: CLINIC | Age: 45
End: 2020-09-04
Payer: COMMERCIAL

## 2020-09-04 DIAGNOSIS — M25.532 LEFT WRIST PAIN: Primary | ICD-10-CM

## 2020-09-04 PROCEDURE — 97140 MANUAL THERAPY 1/> REGIONS: CPT | Performed by: PHYSICAL THERAPIST

## 2020-09-04 PROCEDURE — 97112 NEUROMUSCULAR REEDUCATION: CPT | Performed by: PHYSICAL THERAPIST

## 2020-09-04 PROCEDURE — 97110 THERAPEUTIC EXERCISES: CPT | Performed by: PHYSICAL THERAPIST

## 2020-09-04 NOTE — PROGRESS NOTES
Daily Note     Today's date: 2020  Patient name: Danielle Mitchell  : 1975  MRN: [id number]  Referring provider: Self, Referral  Dx:   Encounter Diagnosis     ICD-10-CM    1  Left wrist pain  M25 532                   Subjective:  Pt reports improving thumb movement, mild stiffness  Objective: See treatment diary below  Assessment: Pt demonstrated MTP edema, improved opposition ROM  Plan: Cont POC  RE NV  Precautions: none  Dx: L carpal tunnel syndrome        Manuals    Laser L CMC, MTP 3500J         4000J                             1st MTP flex with lateral glide MWM 3x10 opposition         3x10 opposition   Graston APB 5 min         3 min   Neuro Re-Ed             Median nerve flossing 1x10         1x10   Carpal tunnel flossing 1x10         1x10                                                                    Ther Ex             ecc wrist ext 4#/ 3x10         4#/ 3x10   Wrist flex 12 5#/ 3x10         12 5# 3x10   Resisted supination/pronation 5#/ 3x15         5#/ 3x15   Digiflex G/ 3x20         G/ 3x20   MTP ext stretch 10"x5         10"x5   AROM thumb ext/flex 1x10         1x10   AROM thumb ABD/ADD 1x10         1x10   AROM with OP thumb ext 2"x10         2"x10   TB thumb flex G/ 3x10         G/ 3x10   TB thumb ext G/ 3x10         G/ 3x10   Thumb opposition AROM with OP 1x10         1x10   Ther Activity                                       Gait Training                                       Modalities

## 2020-09-08 ENCOUNTER — OFFICE VISIT (OUTPATIENT)
Dept: PHYSICAL THERAPY | Facility: CLINIC | Age: 45
End: 2020-09-08
Payer: COMMERCIAL

## 2020-09-08 DIAGNOSIS — M79.645 CHRONIC PAIN OF LEFT THUMB: Primary | ICD-10-CM

## 2020-09-08 DIAGNOSIS — G89.29 CHRONIC PAIN OF LEFT THUMB: Primary | ICD-10-CM

## 2020-09-08 PROCEDURE — 97112 NEUROMUSCULAR REEDUCATION: CPT | Performed by: PHYSICAL THERAPIST

## 2020-09-08 PROCEDURE — 97110 THERAPEUTIC EXERCISES: CPT | Performed by: PHYSICAL THERAPIST

## 2020-09-08 PROCEDURE — 97140 MANUAL THERAPY 1/> REGIONS: CPT | Performed by: PHYSICAL THERAPIST

## 2020-09-08 NOTE — PROGRESS NOTES
PT Evaluation     Today's date: 2020  Patient name: Raul Leal  : 1975  MRN: [id number]  Referring provider: Self, Referral  Dx:   Encounter Diagnosis     ICD-10-CM    1  Chronic pain of left thumb  M79 645     G89 29                   Assessment  Assessment details: Pt demonstrated improved pain, ROM, neural tension and function  Pt will benefit from further treatment for thumb pain to address edema, pain and  strength  Impairments: activity intolerance, impaired physical strength, lacks appropriate home exercise program and pain with function    Goals  ST weeks  1   Pt will decrease pain > 50%  met  2  Pt will normalize median nerve tension  met    LT weeks  1   Pt will decrease pain > 90%  2   Pt will increase L  strength > 80#  Plan  Planned modality interventions: low level laser therapy  Planned therapy interventions: joint mobilization, manual therapy, neuromuscular re-education, body mechanics training, strengthening, stretching, therapeutic activities, therapeutic exercise, functional ROM exercises and home exercise program  Frequency: 2x week  Duration in weeks: 2        Subjective Evaluation    History of Present Illness  Date of onset: 2020  Mechanism of injury: Pt reports a complete resolution of wrist and elbow pain but reports 4/10 L thumb pain with gripping, carrying and squeezing    Pain  Current pain ratin  At best pain ratin  At worst pain ratin  Quality: dull ache and throbbing  Progression: improved      Diagnostic Tests  No diagnostic tests performed  Patient Goals  Patient goals for therapy: decreased pain, decreased edema, increased strength, independence with ADLs/IADLs and return to sport/leisure activities          Objective     Active Range of Motion     Left Elbow   Flexion: WFL  Extension: WFL  Forearm supination: 90 degrees   Forearm pronation: 80 degrees     Left Wrist   Wrist flexion: WFL  Wrist extension: WFL  Radial deviation: WFL  Ulnar deviation: [place] [place]      Joint Play     Left Elbow   Joints within functional limits are the humeroulnar joint, humeroradial joint, proximal radioulnar joint and distal radioulnar joint  Strength/Myotome Testing     Left Elbow   Flexion: 5  Extension: 5  Forearm supination: 5  Forearm pronation: 5    Left Wrist/Hand   Wrist extension: 5  Wrist flexion: 5     (2nd hand position)     Comments: Pt is R hand dominant  7# increase in  strength with taping  Tests     Left Shoulder   Negative ULTT1  Left Elbow   Negative Cozen's, Mill's and Tinel's sign (cubital tunnel)  Left Wrist/Hand   Positive Tinel's sign (medial nerve)  Negative Finkelstein's and resisted middle finger         Precautions: none      Dx: L CMC, MTP OA, DA-9/8/20        Manuals 9/4 9/8                  Laser L CMC, MTP 3500J 4000J                                                                1st MTP flex with lateral glide MWM 3x10 opposition  3x10 opposition                  Graston APB 5 min  5 min                  Neuro Re-Ed                       Median nerve flossing 1x10  1x10                  Carpal tunnel flossing 1x10  1x10                                                                                                                                          Ther Ex                       ecc wrist ext 4#/ 3x10 5#/ 3x10                 Wrist flex 12 5#/ 3x10  15#/ 3x10                 Resisted supination/pronation 5#/ 3x15  5#/ 3x15                 Digiflex G/ 3x20  G/ 3x20                 MTP ext stretch 10"x5  10"x5                 AROM thumb ext/flex 1x10  1x10                  AROM thumb ABD/ADD 1x10  1x10                  AROM with OP thumb ext 2"x10  2x10                  TB thumb flex G/ 3x10  G/ 3x10                  TB thumb ext G/ 3x10  G/ 3x10                  Thumb opposition AROM with OP 1x10  1x10                  Ther Activity                                                                       Gait Training                                                                       Modalities

## 2020-09-10 ENCOUNTER — OFFICE VISIT (OUTPATIENT)
Dept: PHYSICAL THERAPY | Facility: CLINIC | Age: 45
End: 2020-09-10
Payer: COMMERCIAL

## 2020-09-10 DIAGNOSIS — G89.29 CHRONIC PAIN OF LEFT THUMB: Primary | ICD-10-CM

## 2020-09-10 DIAGNOSIS — M79.645 CHRONIC PAIN OF LEFT THUMB: Primary | ICD-10-CM

## 2020-09-10 PROCEDURE — 97140 MANUAL THERAPY 1/> REGIONS: CPT | Performed by: PHYSICAL THERAPIST

## 2020-09-10 PROCEDURE — 97110 THERAPEUTIC EXERCISES: CPT | Performed by: PHYSICAL THERAPIST

## 2020-09-10 PROCEDURE — 97112 NEUROMUSCULAR REEDUCATION: CPT | Performed by: PHYSICAL THERAPIST

## 2020-09-10 NOTE — PROGRESS NOTES
Daily Note     Today's date: 9/10/2020  Patient name: Ralph Skaggs  : 1975  MRN: [id number]  Referring provider: Self, Referral  Dx:   Encounter Diagnosis     ICD-10-CM    1  Chronic pain of left thumb  M79 645     G89 29                   Subjective: Pt reports 2/10 L thumb pain with gripping  Objective: See treatment diary below  Assessment: Pt demonstrated improved  strength  Plan: Cont POC  Precautions: none  Dx: L carpal tunnel syndrome        Manuals 9/4 9/10        9/1   Laser L CMC, MTP 3500J 4000J        4000J                             1st MTP flex with lateral glide MWM 3x10 opposition 3x10        3x10 opposition   Graston APB 5 min 5 min        3 min   Neuro Re-Ed             Median nerve flossing 1x10 1x10        1x10   Carpal tunnel flossing 1x10 1x10        1x10                                                                    Ther Ex             ecc wrist ext 4#/ 3x10 6#/ 3x10        4#/ 3x10   Wrist flex 12 5#/ 3x10 15#/ 3x10        12 5# 3x10   Resisted supination/pronation 5#/ 3x15 6#/ 3x10        5#/ 3x15   Digiflex G/ 3x20 G/ 3x20        G/ 3x20   MTP ext stretch 10"x5 10"x5        10"x5   AROM thumb ext/flex 1x10 1x10        1x10   AROM thumb ABD/ADD 1x10 1x10        1x10   AROM with OP thumb ext 2"x10 2"x10        2"x10   TB thumb flex G/ 3x10 G/ 3x10        G/ 3x10   TB thumb ext G/ 3x10 G/ 3x10        G/ 3x10   Thumb opposition AROM with OP 1x10 1x10        1x10   Ther Activity                                       Gait Training                                       Modalities

## 2020-09-15 ENCOUNTER — OFFICE VISIT (OUTPATIENT)
Dept: PHYSICAL THERAPY | Facility: CLINIC | Age: 45
End: 2020-09-15
Payer: COMMERCIAL

## 2020-09-15 DIAGNOSIS — M79.645 CHRONIC PAIN OF LEFT THUMB: Primary | ICD-10-CM

## 2020-09-15 DIAGNOSIS — G89.29 CHRONIC PAIN OF LEFT THUMB: Primary | ICD-10-CM

## 2020-09-15 PROCEDURE — 97112 NEUROMUSCULAR REEDUCATION: CPT | Performed by: PHYSICAL THERAPIST

## 2020-09-15 PROCEDURE — 97140 MANUAL THERAPY 1/> REGIONS: CPT | Performed by: PHYSICAL THERAPIST

## 2020-09-15 PROCEDURE — 97110 THERAPEUTIC EXERCISES: CPT | Performed by: PHYSICAL THERAPIST

## 2020-09-15 NOTE — PROGRESS NOTES
Daily Note     Today's date: 9/15/2020  Patient name: Suman Mendosa  : 1975  MRN: [id number]  Referring provider: Self, Referral  Dx:   Encounter Diagnosis     ICD-10-CM    1  Chronic pain of left thumb  M79 645     G89 29                   Subjective: Pt reports no L thumb pain, but swelling remains  Objective: See treatment diary below  Discussed the possibility of getting a compression sleeve for the thumb to assist with swelling   strength on L: 72#  Assessment: Pt demonstrated improved ECB tendon edema  Edema appears to be coming from [facility] and MTP joints  Plan: Cont POC  Precautions: none  Dx: L carpal tunnel syndrome        Manuals  9/10 9/15          Laser L CMC, MTP 3500J 4000J 4000J                                    1st MTP flex with lateral glide MWM 3x10 opposition 3x10 3x10          Graston APB 5 min 5 min 5min          Neuro Re-Ed             Median nerve flossing 1x10 1x10 1x10          Carpal tunnel flossing 1x10 1x10 1x10                                                                           Ther Ex             ecc wrist ext 4#/ 3x10 6#/ 3x10 6#/ 3x10 6#/ 3x15         Wrist flex 12 5#/ 3x10 15#/ 3x10 15#/ 3x10 15#/ 3x15         Resisted supination/pronation 5#/ 3x15 6#/ 3x10 6#/ 3x10 6#/ 3x15         Digiflex G/ 3x20 G/ 3x20 G/ 3x20          MTP ext stretch 10"x5 10"x5 10"x5          AROM thumb ext/flex 1x10 1x10 1x10          AROM thumb ABD/ADD 1x10 1x10 1x10          AROM with OP thumb ext 2"x10 2"x10 2"x10          TB thumb flex G/ 3x10 G/ 3x10 G/ 3x10          TB thumb ext G/ 3x10 G/ 3x10 G/ 3x10          Thumb opposition AROM with OP 1x10 1x10 1x10          Ther Activity                                       Gait Training                                       Modalities

## 2020-09-17 ENCOUNTER — OFFICE VISIT (OUTPATIENT)
Dept: PHYSICAL THERAPY | Facility: CLINIC | Age: 45
End: 2020-09-17
Payer: COMMERCIAL

## 2020-09-17 DIAGNOSIS — G89.29 CHRONIC PAIN OF LEFT THUMB: Primary | ICD-10-CM

## 2020-09-17 DIAGNOSIS — M79.645 CHRONIC PAIN OF LEFT THUMB: Primary | ICD-10-CM

## 2020-09-17 PROCEDURE — 97110 THERAPEUTIC EXERCISES: CPT | Performed by: PHYSICAL THERAPIST

## 2020-09-17 PROCEDURE — 97140 MANUAL THERAPY 1/> REGIONS: CPT | Performed by: PHYSICAL THERAPIST

## 2020-09-17 PROCEDURE — 97112 NEUROMUSCULAR REEDUCATION: CPT | Performed by: PHYSICAL THERAPIST

## 2020-09-17 NOTE — PROGRESS NOTES
Daily Note     Today's date: 2020  Patient name: Gregg Cartwright  : 1975  MRN: [id number]  Referring provider: Self, Referral  Dx:   Encounter Diagnosis     ICD-10-CM    1  Chronic pain of left thumb  M79 645     G89 29                   Subjective: Pt reports no L thumb pain, or pain with ADLs  Pt reports pain with heavy gripping or carrying and mild stiffness that is improving  Objective: See treatment diary below  Assessment: Pt demonstrated mod thumb swelling, full ROM  Plan: Cont POC  Precautions: none  Dx: L carpal tunnel syndrome        Manuals 9/4 9/10 9/15 9/17         Laser L CMC, MTP 3500J 4000J 4000J 4000J                                   1st MTP flex with lateral glide MWM 3x10 opposition 3x10 3x10 3x10         Graston APB 5 min 5 min 5min 5 min         Neuro Re-Ed             Median nerve flossing 1x10 1x10 1x10 1x10         Carpal tunnel flossing 1x10 1x10 1x10 1x10                                                                          Ther Ex             ecc wrist ext 4#/ 3x10 6#/ 3x10 6#/ 3x10 6#/ 3x15         Wrist flex 12 5#/ 3x10 15#/ 3x10 15#/ 3x10 15#/ 3x15         Resisted supination/pronation 5#/ 3x15 6#/ 3x10 6#/ 3x10 6#/ 3x15         Digiflex G/ 3x20 G/ 3x20 G/ 3x20 G/ 3x20         MTP ext stretch 10"x5 10"x5 10"x5 10"x5         AROM thumb ext/flex 1x10 1x10 1x10 1x10         AROM thumb ABD/ADD 1x10 1x10 1x10 1x10         AROM with OP thumb ext 2"x10 2"x10 2"x10 2"x10         TB thumb flex G/ 3x10 G/ 3x10 G/ 3x10 G/ 3x10         TB thumb ext G/ 3x10 G/ 3x10 G/ 3x10 G/ 3x10         Thumb opposition AROM with OP 1x10 1x10 1x10 1x10         Ther Activity                                       Gait Training                                       Modalities

## 2020-09-23 ENCOUNTER — OFFICE VISIT (OUTPATIENT)
Dept: PHYSICAL THERAPY | Facility: CLINIC | Age: 45
End: 2020-09-23
Payer: COMMERCIAL

## 2020-09-23 DIAGNOSIS — M79.645 CHRONIC PAIN OF LEFT THUMB: Primary | ICD-10-CM

## 2020-09-23 DIAGNOSIS — M25.532 LEFT WRIST PAIN: ICD-10-CM

## 2020-09-23 DIAGNOSIS — G89.29 CHRONIC PAIN OF LEFT THUMB: Primary | ICD-10-CM

## 2020-09-23 PROCEDURE — 97140 MANUAL THERAPY 1/> REGIONS: CPT | Performed by: PHYSICAL THERAPIST

## 2020-09-23 PROCEDURE — 97110 THERAPEUTIC EXERCISES: CPT | Performed by: PHYSICAL THERAPIST

## 2020-09-23 PROCEDURE — 97112 NEUROMUSCULAR REEDUCATION: CPT | Performed by: PHYSICAL THERAPIST

## 2020-09-23 NOTE — PROGRESS NOTES
Daily Note     Today's date: 2020  Patient name: Shalonda Robbins  : 1975  MRN: [id number]  Referring provider: Self, Referral  Dx:   Encounter Diagnosis     ICD-10-CM    1  Chronic pain of left thumb  M79 645     G89 29    2  Left wrist pain  M25 532                   Subjective: Pt reports L thumb pain with heavy lifting or forceful gripping  Objective: See treatment diary below  Assessment: Pt continues to demonstrate L thumb edema  Plan: Cont POC  RE for within next 1-2 visits  Precautions: none  Dx: L carpal tunnel syndrome        Manuals 9/4 9/10 9/15 9/17 9/23        Laser L CMC, MTP 3500J 4000J 4000J 4000J 4000J                                  1st MTP flex with lateral glide MWM 3x10 opposition 3x10 3x10 3x10 3x10        Graston APB 5 min 5 min 5min 5 min 5 min        Neuro Re-Ed             Median nerve flossing 1x10 1x10 1x10 1x10 1x10        Carpal tunnel flossing 1x10 1x10 1x10 1x10 1x10                                                                         Ther Ex             ecc wrist ext 4#/ 3x10 6#/ 3x10 6#/ 3x10 6#/ 3x15 6#  3x15        Wrist flex 12 5#/ 3x10 15#/ 3x10 15#/ 3x10 15#/ 3x15 15#  3x15        Resisted supination/pronation 5#/ 3x15 6#/ 3x10 6#/ 3x10 6#/ 3x15 6#  3x15        Digiflex G/ 3x20 G/ 3x20 G/ 3x20 G/ 3x20 Blue  3x20        MTP ext stretch 10"x5 10"x5 10"x5 10"x5 10"x5        AROM thumb ext/flex 1x10 1x10 1x10 1x10 1x10        AROM thumb ABD/ADD 1x10 1x10 1x10 1x10 1x10        AROM with OP thumb ext 2"x10 2"x10 2"x10 2"x10 2"x10        TB thumb flex G/ 3x10 G/ 3x10 G/ 3x10 G/ 3x10 G/  3x10        TB thumb ext G/ 3x10 G/ 3x10 G/ 3x10 G/ 3x10 G/  3x10        Thumb opposition AROM with OP 1x10 1x10 1x10 1x10 1x10        Ther Activity                                       Gait Training                                       Modalities

## 2020-09-29 ENCOUNTER — OFFICE VISIT (OUTPATIENT)
Dept: PHYSICAL THERAPY | Facility: CLINIC | Age: 45
End: 2020-09-29
Payer: COMMERCIAL

## 2020-09-29 DIAGNOSIS — G89.29 CHRONIC PAIN OF LEFT THUMB: Primary | ICD-10-CM

## 2020-09-29 DIAGNOSIS — M25.532 LEFT WRIST PAIN: ICD-10-CM

## 2020-09-29 DIAGNOSIS — M79.645 CHRONIC PAIN OF LEFT THUMB: Primary | ICD-10-CM

## 2020-09-29 PROCEDURE — 97112 NEUROMUSCULAR REEDUCATION: CPT | Performed by: PHYSICAL THERAPIST

## 2020-09-29 PROCEDURE — 97140 MANUAL THERAPY 1/> REGIONS: CPT | Performed by: PHYSICAL THERAPIST

## 2020-09-29 PROCEDURE — 97110 THERAPEUTIC EXERCISES: CPT | Performed by: PHYSICAL THERAPIST

## 2020-09-29 NOTE — PROGRESS NOTES
Daily Note     Today's date: 2020  Patient name: Rajesh Hughes  : 1975  MRN: [id number]  Referring provider: Self, Referral  Dx:   Encounter Diagnosis     ICD-10-CM    1  Chronic pain of left thumb  M79 645     G89 29    2  Left wrist pain  M25 532                   Subjective: Pt reports only mild L thumb pain but reports constant swelling  Objective: See treatment diary below  Assessment: Pt continues to demonstrate L thumb edema likely secondary to MTP OA  Discussed joint preservation with pt  Plan: Cont POC  Precautions: none  Dx: L carpal tunnel syndrome        Manuals 9/4 9/10 9/15 9/17 9/23 9/29       Laser L CMC, MTP 3500J 4000J 4000J 4000J 4000J 4000J                                 1st MTP flex with lateral glide MWM 3x10 opposition 3x10 3x10 3x10 3x10 3x10       Graston APB 5 min 5 min 5min 5 min 5 min 5 min       Neuro Re-Ed             Median nerve flossing 1x10 1x10 1x10 1x10 1x10 1x10       Carpal tunnel flossing 1x10 1x10 1x10 1x10 1x10 1x10                                                                        Ther Ex             ecc wrist ext 4#/ 3x10 6#/ 3x10 6#/ 3x10 6#/ 3x15 6#  3x15 6#/ 3x15       Wrist flex 12 5#/ 3x10 15#/ 3x10 15#/ 3x10 15#/ 3x15 15#  3x15 15#/ 3x15       Resisted supination/pronation 5#/ 3x15 6#/ 3x10 6#/ 3x10 6#/ 3x15 6#  3x15 6#/ 3x15       Digiflex G/ 3x20 G/ 3x20 G/ 3x20 G/ 3x20 Blue  3x20 Roddy/ 3x20       MTP ext stretch 10"x5 10"x5 10"x5 10"x5 10"x5 10"x5       AROM thumb ext/flex 1x10 1x10 1x10 1x10 1x10 1x10       AROM thumb ABD/ADD 1x10 1x10 1x10 1x10 1x10 1x10       AROM with OP thumb ext 2"x10 2"x10 2"x10 2"x10 2"x10 2"x10       TB thumb flex G/ 3x10 G/ 3x10 G/ 3x10 G/ 3x10 G/  3x10 Roddy/ 3x10       TB thumb ext G/ 3x10 G/ 3x10 G/ 3x10 G/ 3x10 G/  3x10 Roddy/ 3x10       Thumb opposition AROM with OP 1x10 1x10 1x10 1x10 1x10 1x10       Ther Activity                                       Gait Training Modalities

## 2020-10-05 ENCOUNTER — OFFICE VISIT (OUTPATIENT)
Dept: PHYSICAL THERAPY | Facility: CLINIC | Age: 45
End: 2020-10-05
Payer: COMMERCIAL

## 2020-10-05 DIAGNOSIS — M79.645 CHRONIC PAIN OF LEFT THUMB: Primary | ICD-10-CM

## 2020-10-05 DIAGNOSIS — G89.29 CHRONIC PAIN OF LEFT THUMB: Primary | ICD-10-CM

## 2020-10-05 PROCEDURE — 97140 MANUAL THERAPY 1/> REGIONS: CPT | Performed by: PHYSICAL THERAPIST

## 2020-10-05 PROCEDURE — 97110 THERAPEUTIC EXERCISES: CPT | Performed by: PHYSICAL THERAPIST

## 2020-10-05 PROCEDURE — 97112 NEUROMUSCULAR REEDUCATION: CPT | Performed by: PHYSICAL THERAPIST

## 2020-10-15 ENCOUNTER — OFFICE VISIT (OUTPATIENT)
Dept: PHYSICAL THERAPY | Facility: CLINIC | Age: 45
End: 2020-10-15
Payer: COMMERCIAL

## 2020-10-15 DIAGNOSIS — M79.645 CHRONIC PAIN OF LEFT THUMB: Primary | ICD-10-CM

## 2020-10-15 DIAGNOSIS — G89.29 CHRONIC PAIN OF LEFT THUMB: Primary | ICD-10-CM

## 2020-10-15 PROCEDURE — 97140 MANUAL THERAPY 1/> REGIONS: CPT | Performed by: PHYSICAL THERAPIST

## 2020-10-15 PROCEDURE — 97112 NEUROMUSCULAR REEDUCATION: CPT | Performed by: PHYSICAL THERAPIST

## 2020-10-15 PROCEDURE — 97110 THERAPEUTIC EXERCISES: CPT | Performed by: PHYSICAL THERAPIST

## 2021-01-08 ENCOUNTER — IMMUNIZATIONS (OUTPATIENT)
Dept: FAMILY MEDICINE CLINIC | Facility: HOSPITAL | Age: 46
End: 2021-01-08

## 2021-01-08 DIAGNOSIS — Z23 ENCOUNTER FOR IMMUNIZATION: ICD-10-CM

## 2021-01-08 PROCEDURE — 0011A SARS-COV-2 / COVID-19 MRNA VACCINE (MODERNA) 100 MCG: CPT

## 2021-01-08 PROCEDURE — 91301 SARS-COV-2 / COVID-19 MRNA VACCINE (MODERNA) 100 MCG: CPT

## 2021-02-04 ENCOUNTER — IMMUNIZATIONS (OUTPATIENT)
Dept: FAMILY MEDICINE CLINIC | Facility: HOSPITAL | Age: 46
End: 2021-02-04

## 2021-02-04 DIAGNOSIS — Z23 ENCOUNTER FOR IMMUNIZATION: Primary | ICD-10-CM

## 2021-02-04 PROCEDURE — 91301 SARS-COV-2 / COVID-19 MRNA VACCINE (MODERNA) 100 MCG: CPT

## 2021-02-04 PROCEDURE — 0012A SARS-COV-2 / COVID-19 MRNA VACCINE (MODERNA) 100 MCG: CPT

## 2021-03-02 ENCOUNTER — OFFICE VISIT (OUTPATIENT)
Dept: INTERNAL MEDICINE CLINIC | Facility: CLINIC | Age: 46
End: 2021-03-02
Payer: COMMERCIAL

## 2021-03-02 VITALS
WEIGHT: 184 LBS | DIASTOLIC BLOOD PRESSURE: 78 MMHG | OXYGEN SATURATION: 98 % | RESPIRATION RATE: 16 BRPM | SYSTOLIC BLOOD PRESSURE: 124 MMHG | HEART RATE: 78 BPM | BODY MASS INDEX: 24.95 KG/M2

## 2021-03-02 DIAGNOSIS — Z13.1 SCREENING FOR DIABETES MELLITUS: ICD-10-CM

## 2021-03-02 DIAGNOSIS — Z13.220 SCREENING FOR HYPERLIPIDEMIA: ICD-10-CM

## 2021-03-02 DIAGNOSIS — M18.12 ARTHRITIS OF CARPOMETACARPAL (CMC) JOINT OF LEFT THUMB: Primary | ICD-10-CM

## 2021-03-02 DIAGNOSIS — Z12.5 SCREENING FOR PROSTATE CANCER: ICD-10-CM

## 2021-03-02 DIAGNOSIS — Z13.29 SCREENING FOR THYROID DISORDER: ICD-10-CM

## 2021-03-02 PROCEDURE — 99396 PREV VISIT EST AGE 40-64: CPT | Performed by: INTERNAL MEDICINE

## 2021-03-02 PROCEDURE — 3725F SCREEN DEPRESSION PERFORMED: CPT | Performed by: INTERNAL MEDICINE

## 2021-03-02 NOTE — PROGRESS NOTES
Assessment/Plan:    #Left Thumb Arthritis  -present almost 1 year  -denies trauma  -has edema with joint locking on flexion and extension  -pain with extension and flexion  -completed PT without improvement  -will obtain XR and refer to ortho    #Family History RA  -noted in mother  -will obtain RF, CCP    #Skin Exam  -seeing derm yearly for skin exam every January    #Health Maintenance  -routine labs and followup 1 year  -planning on retiring this year or the following from [address] [place] [address] on moving to [facility]    Addendum 07/09/2021 patient was seen by Orthopedics and underwent ultrasound imaging the 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  No problem-specific Assessment & Plan notes found for this encounter  Diagnoses and all orders for this visit:    Arthritis of carpometacarpal [place]) joint of left thumb  -     CBC and differential  -     Comprehensive metabolic panel  -     XR hand 3+ vw left; Future  -     Ambulatory referral to Hand Surgery; Future  -     RF Screen w/ Reflex to Titer  -     Cyclic citrul peptide antibody, IgG    Screening for diabetes mellitus  -     CBC and differential  -     Hemoglobin A1C  -     Comprehensive metabolic panel    Screening for hyperlipidemia  -     CBC and differential  -     Comprehensive metabolic panel  -     Lipid Panel With Direct LDL    Screening for prostate cancer  -     CBC and differential  -     Comprehensive metabolic panel  -     PSA, total and free    Screening for thyroid disorder  -     CBC and differential  -     TSH, 3rd generation with Free T4 reflex  -     Comprehensive metabolic panel            Current Outpatient Medications:     Multiple Vitamins-Minerals (MULTIVITAMIN ADULT PO), Take 1 tablet by mouth daily, Disp: , Rfl:     Subjective:      Patient ID: Leanne Corley is a 50 y o  male      HPI    Patient presents for an acute visit however since he has not been here in a while we turned this into of routine visit  Patient reports that for the past year he has been doing physical therapy to his left thumb without any improvement  He has minor edema there with some pain whenever he is flexing that thumb or extending it  He states that it is not improved  He has not taken anything for relief  He has been wearing a compression glove at night without any improvement  He denies any numbness or tingling  He is right handed  At this time we will refer him to orthopedics for further evaluation  We will obtain an x-ray  Patient reports that he has a family history of rheumatoid arthritis in his mother  We will obtain a rheumatoid factor as well as CCP  Patient is up-to-date on his COVID vaccine as well as his flu vaccine  We will obtain routine labs  Patient will return to care in 1 year  Patient is looking forward towards [facility] however he states that he is trying to keep busy  The following portions of the patient's history were reviewed and updated as appropriate: allergies, current medications, past family history, past medical history, past social history, past surgical history and problem list     Review of Systems   Constitutional: Negative for activity change, appetite change, fatigue and fever  HENT: Negative for congestion, ear pain, hearing loss, sore throat and tinnitus  Eyes: Negative for photophobia, pain and visual disturbance  Respiratory: Negative for cough, shortness of breath and wheezing  Cardiovascular: Negative for chest pain and leg swelling  Gastrointestinal: Negative for abdominal distention, abdominal pain, nausea and vomiting  Genitourinary: Negative for difficulty urinating, frequency and hematuria  Musculoskeletal: Positive for arthralgias  Negative for back pain, joint swelling, neck pain and neck stiffness  Skin: Negative for color change, pallor, rash and wound  Neurological: Negative for dizziness, tremors, numbness and headaches  Hematological: Does not bruise/bleed easily  Objective:      /78   Pulse 78   Resp 16   Wt 83 5 kg (184 lb)   SpO2 98%   BMI 24 95 kg/m²          Physical Exam  Vitals signs reviewed  Constitutional:       Appearance: Normal appearance  He is well-developed  HENT:      Head: Normocephalic and atraumatic  Right Ear: Tympanic membrane, ear canal and external ear normal  There is no impacted cerumen  Left Ear: Tympanic membrane, ear canal and external ear normal  There is no impacted cerumen  Nose: Nose normal    Eyes:      Conjunctiva/sclera: Conjunctivae normal       Pupils: Pupils are equal, round, and reactive to light  Neck:      Musculoskeletal: Normal range of motion and neck supple  Thyroid: No thyromegaly  Vascular: No JVD  Cardiovascular:      Rate and Rhythm: Normal rate and regular rhythm  Pulses: Normal pulses  Heart sounds: Normal heart sounds  No murmur  Pulmonary:      Effort: Pulmonary effort is normal  No respiratory distress  Breath sounds: Normal breath sounds  No stridor  No wheezing, rhonchi or rales  Abdominal:      General: Bowel sounds are normal  There is no distension  Palpations: Abdomen is soft  There is no mass  Tenderness: There is no abdominal tenderness  There is no right CVA tenderness, left CVA tenderness, guarding or rebound  Musculoskeletal: Normal range of motion  General: Swelling (left thumb) and tenderness (left thumb) present  No deformity or signs of injury  Right lower leg: No edema  Left lower leg: No edema  Lymphadenopathy:      Cervical: No cervical adenopathy  Skin:     General: Skin is warm and dry  Findings: No erythema or rash  Neurological:      Mental Status: He is alert and oriented to person, place, and time  Deep Tendon Reflexes: Reflexes are normal and symmetric             This note was completed in part utilizing m-modal fluency direct voice recognition software  Grammatical errors, random word insertion, spelling mistakes, and incomplete sentences may be an occasional consequence of the system secondary to software limitations, ambient noise and hardware issues  At the time of dictation, efforts were made to edit, clarify and /or correct errors  Please read the chart carefully and recognize, using context, where substitutions have occurred  If you have any questions or concerns about the context, text or information contained within the body of this dictation, please contact myself, the provider, for further clarification

## 2021-03-03 ENCOUNTER — HOSPITAL ENCOUNTER (OUTPATIENT)
Dept: RADIOLOGY | Facility: HOSPITAL | Age: 46
Discharge: HOME | End: 2021-03-03
Payer: COMMERCIAL

## 2021-03-03 ENCOUNTER — TRANSCRIBE ORDERS (OUTPATIENT)
Dept: LAB | Facility: CLINIC | Age: 46
End: 2021-03-03

## 2021-03-03 ENCOUNTER — LAB (OUTPATIENT)
Dept: LAB | Facility: CLINIC | Age: 46
End: 2021-03-03
Payer: COMMERCIAL

## 2021-03-03 DIAGNOSIS — Z12.5 SCREENING FOR PROSTATE CANCER: ICD-10-CM

## 2021-03-03 DIAGNOSIS — Z13.29 SCREENING FOR THYROID DISORDER: ICD-10-CM

## 2021-03-03 DIAGNOSIS — N40.0 BENIGN PROSTATIC HYPERPLASIA WITHOUT LOWER URINARY TRACT SYMPTOMS: ICD-10-CM

## 2021-03-03 DIAGNOSIS — Z13.1 SCREENING FOR DIABETES MELLITUS: ICD-10-CM

## 2021-03-03 DIAGNOSIS — M18.12 ARTHRITIS OF CARPOMETACARPAL (CMC) JOINT OF LEFT THUMB: Primary | ICD-10-CM

## 2021-03-03 DIAGNOSIS — E78.2 MODERATE MIXED HYPERLIPIDEMIA NOT REQUIRING STATIN THERAPY: Primary | ICD-10-CM

## 2021-03-03 DIAGNOSIS — Z12.5 SPECIAL SCREENING, PROSTATE CANCER: ICD-10-CM

## 2021-03-03 DIAGNOSIS — Z13.220 SCREENING FOR HYPERLIPIDEMIA: ICD-10-CM

## 2021-03-03 DIAGNOSIS — M18.12 ARTHRITIS OF CARPOMETACARPAL (CMC) JOINT OF LEFT THUMB: ICD-10-CM

## 2021-03-03 LAB
ALBUMIN SERPL BCP-MCNC: 4.4 G/DL (ref 3.5–5)
ALP SERPL-CCNC: 66 U/L (ref 46–116)
ALT SERPL W P-5'-P-CCNC: 28 U/L (ref 12–78)
ANION GAP SERPL CALCULATED.3IONS-SCNC: 9 MMOL/L (ref 4–13)
AST SERPL W P-5'-P-CCNC: 12 U/L (ref 5–45)
BASOPHILS # BLD AUTO: 0.03 THOUSANDS/ΜL (ref 0–0.1)
BASOPHILS NFR BLD AUTO: 0 % (ref 0–1)
BILIRUB SERPL-MCNC: 0.64 MG/DL (ref 0.2–1)
BUN SERPL-MCNC: 17 MG/DL (ref 5–25)
CALCIUM SERPL-MCNC: 9.1 MG/DL (ref 8.3–10.1)
CHLORIDE SERPL-SCNC: 103 MMOL/L (ref 100–108)
CHOLEST SERPL-MCNC: 201 MG/DL (ref 50–200)
CO2 SERPL-SCNC: 28 MMOL/L (ref 21–32)
CREAT SERPL-MCNC: 1.2 MG/DL (ref 0.6–1.3)
EOSINOPHIL # BLD AUTO: 0.08 THOUSAND/ΜL (ref 0–0.61)
EOSINOPHIL NFR BLD AUTO: 1 % (ref 0–6)
ERYTHROCYTE [DISTWIDTH] IN BLOOD BY AUTOMATED COUNT: 12.1 % (ref 11.6–15.1)
EST. AVERAGE GLUCOSE BLD GHB EST-MCNC: 108 MG/DL
GFR SERPL CREATININE-BSD FRML MDRD: 72 ML/MIN/1.73SQ M
GLUCOSE P FAST SERPL-MCNC: 91 MG/DL (ref 65–99)
HBA1C MFR BLD: 5.4 %
HCT VFR BLD AUTO: 45.1 % (ref 36.5–49.3)
HDLC SERPL-MCNC: 55 MG/DL
HGB BLD-MCNC: 14.6 G/DL (ref 12–17)
IMM GRANULOCYTES # BLD AUTO: 0.02 THOUSAND/UL (ref 0–0.2)
IMM GRANULOCYTES NFR BLD AUTO: 0 % (ref 0–2)
LDLC SERPL CALC-MCNC: 120 MG/DL (ref 0–100)
LYMPHOCYTES # BLD AUTO: 1.87 THOUSANDS/ΜL (ref 0.6–4.47)
LYMPHOCYTES NFR BLD AUTO: 27 % (ref 14–44)
MCH RBC QN AUTO: 29 PG (ref 26.8–34.3)
MCHC RBC AUTO-ENTMCNC: 32.4 G/DL (ref 31.4–37.4)
MCV RBC AUTO: 90 FL (ref 82–98)
MONOCYTES # BLD AUTO: 0.51 THOUSAND/ΜL (ref 0.17–1.22)
MONOCYTES NFR BLD AUTO: 8 % (ref 4–12)
NEUTROPHILS # BLD AUTO: 4.31 THOUSANDS/ΜL (ref 1.85–7.62)
NEUTS SEG NFR BLD AUTO: 64 % (ref 43–75)
NRBC BLD AUTO-RTO: 0 /100 WBCS
PLATELET # BLD AUTO: 262 THOUSANDS/UL (ref 149–390)
PMV BLD AUTO: 10.5 FL (ref 8.9–12.7)
POTASSIUM SERPL-SCNC: 4.6 MMOL/L (ref 3.5–5.3)
PROT SERPL-MCNC: 7.6 G/DL (ref 6.4–8.2)
RBC # BLD AUTO: 5.03 MILLION/UL (ref 3.88–5.62)
SODIUM SERPL-SCNC: 140 MMOL/L (ref 136–145)
TRIGL SERPL-MCNC: 128 MG/DL
TSH SERPL DL<=0.05 MIU/L-ACNC: 1.98 UIU/ML (ref 0.36–3.74)
WBC # BLD AUTO: 6.82 THOUSAND/UL (ref 4.31–10.16)

## 2021-03-03 PROCEDURE — 80061 LIPID PANEL: CPT

## 2021-03-03 PROCEDURE — 84443 ASSAY THYROID STIM HORMONE: CPT | Performed by: INTERNAL MEDICINE

## 2021-03-03 PROCEDURE — 83036 HEMOGLOBIN GLYCOSYLATED A1C: CPT | Performed by: INTERNAL MEDICINE

## 2021-03-03 PROCEDURE — 86200 CCP ANTIBODY: CPT | Performed by: INTERNAL MEDICINE

## 2021-03-03 PROCEDURE — 73130 X-RAY EXAM OF HAND: CPT

## 2021-03-03 PROCEDURE — 85025 COMPLETE CBC W/AUTO DIFF WBC: CPT | Performed by: INTERNAL MEDICINE

## 2021-03-03 PROCEDURE — 84154 ASSAY OF PSA FREE: CPT | Performed by: INTERNAL MEDICINE

## 2021-03-03 PROCEDURE — 80053 COMPREHEN METABOLIC PANEL: CPT | Performed by: INTERNAL MEDICINE

## 2021-03-03 PROCEDURE — 86430 RHEUMATOID FACTOR TEST QUAL: CPT | Performed by: INTERNAL MEDICINE

## 2021-03-03 PROCEDURE — 36415 COLL VENOUS BLD VENIPUNCTURE: CPT | Performed by: INTERNAL MEDICINE

## 2021-03-03 PROCEDURE — 84153 ASSAY OF PSA TOTAL: CPT | Performed by: INTERNAL MEDICINE

## 2021-03-04 ENCOUNTER — TELEPHONE (OUTPATIENT)
Dept: INTERNAL MEDICINE CLINIC | Facility: CLINIC | Age: 46
End: 2021-03-04

## 2021-03-04 LAB — RHEUMATOID FACT SER QL LA: NEGATIVE

## 2021-03-04 NOTE — TELEPHONE ENCOUNTER
----- Message from Kathrin Bowers DO sent at 3/3/2021  5:33 PM EST -----  Labs were discussed with patient and informed him to cut down on fatty foods  Will repeat labs in 1 year  Please mail him labs to obtain next year

## 2021-03-05 ENCOUNTER — TELEPHONE (OUTPATIENT)
Dept: INTERNAL MEDICINE CLINIC | Facility: CLINIC | Age: 46
End: 2021-03-05

## 2021-03-05 LAB
CCP IGA+IGG SERPL IA-ACNC: 5 UNITS (ref 0–19)
PSA FREE MFR SERPL: 38.6 %
PSA FREE SERPL-MCNC: 0.27 NG/ML
PSA SERPL-MCNC: 0.7 NG/ML (ref 0–4)

## 2021-03-05 NOTE — TELEPHONE ENCOUNTER
----- Message from Donn Whiting DO sent at 3/5/2021  2:21 PM EST -----  Please let patient know he has arthritis in his left thumb, he will need to see orthopedics for consideration of a joint injection there for relief         Please also let patient know he does not have rheumatoid arthritis

## 2021-03-16 ENCOUNTER — OFFICE VISIT (OUTPATIENT)
Dept: OBGYN CLINIC | Facility: MEDICAL CENTER | Age: 46
End: 2021-03-16
Payer: COMMERCIAL

## 2021-03-16 VITALS
DIASTOLIC BLOOD PRESSURE: 87 MMHG | HEIGHT: 72 IN | SYSTOLIC BLOOD PRESSURE: 131 MMHG | WEIGHT: 184 LBS | HEART RATE: 82 BPM | BODY MASS INDEX: 24.92 KG/M2

## 2021-03-16 DIAGNOSIS — M65.312 TRIGGER FINGER OF LEFT THUMB: Primary | ICD-10-CM

## 2021-03-16 DIAGNOSIS — M18.12 ARTHRITIS OF CARPOMETACARPAL (CMC) JOINT OF LEFT THUMB: ICD-10-CM

## 2021-03-16 PROCEDURE — 3008F BODY MASS INDEX DOCD: CPT | Performed by: ORTHOPAEDIC SURGERY

## 2021-03-16 PROCEDURE — 20550 NJX 1 TENDON SHEATH/LIGAMENT: CPT | Performed by: ORTHOPAEDIC SURGERY

## 2021-03-16 PROCEDURE — 99244 OFF/OP CNSLTJ NEW/EST MOD 40: CPT | Performed by: ORTHOPAEDIC SURGERY

## 2021-03-16 PROCEDURE — 1036F TOBACCO NON-USER: CPT | Performed by: ORTHOPAEDIC SURGERY

## 2021-03-16 RX ORDER — BETAMETHASONE SODIUM PHOSPHATE AND BETAMETHASONE ACETATE 3; 3 MG/ML; MG/ML
3 INJECTION, SUSPENSION INTRA-ARTICULAR; INTRALESIONAL; INTRAMUSCULAR; SOFT TISSUE
Status: COMPLETED | OUTPATIENT
Start: 2021-03-16 | End: 2021-03-16

## 2021-03-16 RX ORDER — LIDOCAINE HYDROCHLORIDE 10 MG/ML
0.5 INJECTION, SOLUTION INFILTRATION; PERINEURAL
Status: COMPLETED | OUTPATIENT
Start: 2021-03-16 | End: 2021-03-16

## 2021-03-16 RX ADMIN — BETAMETHASONE SODIUM PHOSPHATE AND BETAMETHASONE ACETATE 3 MG: 3; 3 INJECTION, SUSPENSION INTRA-ARTICULAR; INTRALESIONAL; INTRAMUSCULAR; SOFT TISSUE at 14:33

## 2021-03-16 RX ADMIN — LIDOCAINE HYDROCHLORIDE 0.5 ML: 10 INJECTION, SOLUTION INFILTRATION; PERINEURAL at 14:33

## 2021-03-16 NOTE — PATIENT INSTRUCTIONS
Trigger Finger    DESCRIPTION  Stenosing tenosynovitis is a condition commonly known as trigger finger   It is sometimes also called trigger thumb   The tendons that bend the fingers glide easily with the help of pulleys  These pulleys hold the tendons close to the bone  This is similar to how a line is held on a fishing brent  Trigger finger occurs when the pulley becomes too thick, so the tendon cannot glide easily through it  CAUSES  Trigger fingers are more common with certain medical conditions such as rheumatoid arthritis, gout and diabetes  Repeated and strong gripping may lead to the condition  In most cases, the cause of the trigger finger is not known  SIGNS AND SYMPTOMS  Trigger finger may start with discomfort felt at the base of the finger or thumb, where the finger joins the palm  This area is often sensitive to pressure  You might feel a lump there  Other symptoms may include:    -Pain  -Popping  -Catching feeling  -Limited finger movement    TREATMENT  The goal of treatment in trigger finger is to eliminate the swelling and catching/locking, allowing full, painless movement of the finger or thumb  Common treatments include, but are not limited to:    Night splints  Anti-inflammatory medication  Changing your activity  Steroid injection    If non-surgical treatments do not relieve the symptoms, surgery may be recommended  The goal of surgery is to open the pulley at the base of the finger so that the tendon can glide more freely  The clicking or popping goes away first  Finger motion can return quickly, or there can be some stiffness after surgery  After either an injection or surgery, it is important to stretch the fingers (especially the PIP joint, or second knuckle) to prevent stiffness (See pictures below)

## 2021-03-16 NOTE — PROGRESS NOTES
Chief Complaint     Left thumb pain       History of Present Illness     Reynaldo Begum is a 50 y o  right hand dominant male who presents to the office today for left thumb pain  I am seeing him in consultation at the request of Dr Lashawn Solares states in July he began to experience pain to the base of his left thumb  He denies any injury or trama  He notes a clicking and popping sensation with thumb flexion  He also notes swelling to his thenar eminence  Jolly Swansonministerio is not currently taking anything for pain control  Denies any numbness or tingling  Likes to play golf and cannot do this  Has been in therapy and doing multiple modalities there that have not helped      Jolly Swansonministerio is currently working as a           History reviewed  No pertinent past medical history  Past Surgical History:   Procedure Laterality Date    HERNIA REPAIR         No Known Allergies    Current Outpatient Medications on File Prior to Visit   Medication Sig Dispense Refill    Multiple Vitamins-Minerals (MULTIVITAMIN ADULT PO) Take 1 tablet by mouth daily       No current facility-administered medications on file prior to visit  Social History     Tobacco Use    Smoking status: Never Smoker    Smokeless tobacco: Never Used   Substance Use Topics    Alcohol use: No    Drug use: No       Family History   Problem Relation Age of Onset    Cancer Mother     Arthritis Mother     Irritable bowel syndrome Father     Hypertension Father     Heart disease Family        Review of Systems     As stated in the HPI  All other systems were reviewed and are negative  Physical Exam     /87   Pulse 82   Ht 6' (1 829 m)   Wt 83 5 kg (184 lb)   BMI 24 95 kg/m²     GENERAL: This is a well-developed, well-nourished, age-appropriate patient in no acute distress  The patient is alert and oriented x3  Pleasant and cooperative  Eyes: Anicteric sclerae  Extraocular movements appear intact    HENT: Nares are patent with no drainage  Lungs: There is equal chest rise on inspection  Breathing is non-labored with no audible wheezing  Cardiovascular: No cyanosis  No upper extremity lymphadema  Skin: Skin is warm to touch  No obvious skin lesions or rashes other than described below  Neurologic: No ataxia  Psychiatric: Mood and affect are appropriate  Left thumb:   No erythema or ecchymosis   Mild edema   Tender to palpation over A1 pulley of the thumb   Full extension  IP joint and symmetric MCP extension compared to the contralateral  Opposition intact to P1 of the small finger  + triggering   Non tender to palpation over the [facility] joint   Stable collateral ligament stress exam   DPC 0   Brisk capillary refill      Data Review     Results Reviewed     None             Imaging:  X-rays of the left hand obtained on 03/03/2021 and personally reviewed by myself demonstrates mild 1st MCP arthritis  Assessment and Plan      Diagnoses and all orders for this visit:    Trigger finger of left thumb  -     Hand/upper extremity injection: L thumb A1    Arthritis of carpometacarpal (CMC) joint of left thumb  -     Ambulatory referral to Hand Surgery         50 y o  male with left thumb trigger finger  I discussed the natural course and treatment options of trigger finger with the patient  We discussed that the etiology is thickening of the tendon sheath surrounding the flexor tendons in the distal palm and that common symptoms are pain, catching, clicking, popping, and locking of the digit  We also discussed that there are surgical and nonsurgical means to treat this  Activity modification can be somewhat helpful, though corticosteroid injections are often the first-line treatment for this condition  The published efficacy of the 1st injection for trigger finger is about 45% at achieving full remission and that we may pursue up to 3 injections per the patient's desire if symptomatology returns    At any point, the patient may choose to have surgical intervention which would involve release of the A1 pulley  We discussed the technical aspects, risks, and benefits of the procedure, perioperative care, and expected recovery from this surgery  * Left thumb trigger finger CSI was performed in the office without complication   * Post injection protocol was discussed       Follow Up: 4 weeks, PRN     To Do Next Visit: possible 2nd trigger finger CSI vs surgical discussion     PROCEDURES PERFORMED:  Hand/upper extremity injection: L thumb A1  Universal Protocol:  Consent: Verbal consent obtained  Written consent not obtained  Risks and benefits: risks, benefits and alternatives were discussed  Consent given by: patient  Time out: Immediately prior to procedure a "time out" was called to verify the correct patient, procedure, equipment, support staff and site/side marked as required    Site marked: the operative site was marked  Patient identity confirmed: verbally with patient    Supporting Documentation  Indications: pain   Procedure Details  Condition:trigger finger Location: thumb - L thumb A1   Preparation: Patient was prepped and draped in the usual sterile fashion  Needle size: 25 G  Ultrasound guidance: no  Medications administered: 0 5 mL lidocaine 1 %; 3 mg betamethasone acetate-betamethasone sodium phosphate 6 (3-3) mg/mL    Patient tolerance: patient tolerated the procedure well with no immediate complications  Dressing:  Sterile dressing applied              Scribe Attestation    I,:  Peggy Reed am acting as a scribe while in the presence of the attending physician :       I,:  Gage Ni MD personally performed the services described in this documentation    as scribed in my presence :

## 2021-03-16 NOTE — LETTER
March 16, 2021     Brenna Quintanilla DO  [address] [address]    Patient: Dania Bradley   YOB: 1975   Date of Visit: 3/16/2021       Dear Dr Morris Pert:    Thank you for referring Dania Bradley to me for evaluation  Below are my notes for this consultation  If you have questions, please do not hesitate to call me  I look forward to following your patient along with you  Sincerely,        Linwood Benitez MD        CC: No Recipients  Linwood Benitez MD  3/16/2021  4:48 PM  Signed  Chief Complaint     Left thumb pain       History of Present Illness     Dania Bradley is a 50 y o  right hand dominant male who presents to the office today for left thumb pain  I am seeing him in consultation at the request of Dr Saintclair Fend Irish Raul states in July he began to experience pain to the base of his left thumb  He denies any injury or trama  He notes a clicking and popping sensation with thumb flexion  He also notes swelling to his thenar eminence  [place] Carter is not currently taking anything for pain control  Denies any numbness or tingling  Likes to play golf and cannot do this  Has been in therapy and doing multiple modalities there that have not helped      Belinda Carter is currently working as a           History reviewed  No pertinent past medical history  Past Surgical History:   Procedure Laterality Date    HERNIA REPAIR         No Known Allergies    Current Outpatient Medications on File Prior to Visit   Medication Sig Dispense Refill    Multiple Vitamins-Minerals (MULTIVITAMIN ADULT PO) Take 1 tablet by mouth daily       No current facility-administered medications on file prior to visit          Social History     Tobacco Use    Smoking status: Never Smoker    Smokeless tobacco: Never Used   Substance Use Topics    Alcohol use: No    Drug use: No       Family History   Problem Relation Age of Onset    Cancer Mother     Arthritis Mother    Sachinmick Gabino Irritable bowel syndrome Father     Hypertension Father     Heart disease Family        Review of Systems     As stated in the HPI  All other systems were reviewed and are negative  Physical Exam     /87   Pulse 82   Ht 6' (1 829 m)   Wt 83 5 kg (184 lb)   BMI 24 95 kg/m²     GENERAL: This is a well-developed, well-nourished, age-appropriate patient in no acute distress  The patient is alert and oriented x3  Pleasant and cooperative  Eyes: Anicteric sclerae  Extraocular movements appear intact  HENT: Nares are patent with no drainage  Lungs: There is equal chest rise on inspection  Breathing is non-labored with no audible wheezing  Cardiovascular: No cyanosis  No upper extremity lymphadema  Skin: Skin is warm to touch  No obvious skin lesions or rashes other than described below  Neurologic: No ataxia  Psychiatric: Mood and affect are appropriate  Left thumb:   No erythema or ecchymosis   Mild edema   Tender to palpation over A1 pulley of the thumb   Full extension  IP joint and symmetric MCP extension compared to the contralateral  Opposition intact to P1 of the small finger  + triggering   Non tender to palpation over the [facility] joint   Stable collateral ligament stress exam   DPC 0   Brisk capillary refill      Data Review     Results Reviewed     None             Imaging:  X-rays of the left hand obtained on 03/03/2021 and personally reviewed by myself demonstrates mild 1st MCP arthritis  Assessment and Plan      Diagnoses and all orders for this visit:    Trigger finger of left thumb  -     Hand/upper extremity injection: L thumb A1    Arthritis of carpometacarpal (CMC) joint of left thumb  -     Ambulatory referral to Hand Surgery         50 y o  male with left thumb trigger finger  I discussed the natural course and treatment options of trigger finger with the patient    We discussed that the etiology is thickening of the tendon sheath surrounding the flexor tendons in the distal palm and that common symptoms are pain, catching, clicking, popping, and locking of the digit  We also discussed that there are surgical and nonsurgical means to treat this  Activity modification can be somewhat helpful, though corticosteroid injections are often the first-line treatment for this condition  The published efficacy of the 1st injection for trigger finger is about 45% at achieving full remission and that we may pursue up to 3 injections per the patient's desire if symptomatology returns  At any point, the patient may choose to have surgical intervention which would involve release of the A1 pulley  We discussed the technical aspects, risks, and benefits of the procedure, perioperative care, and expected recovery from this surgery  * Left thumb trigger finger CSI was performed in the office without complication   * Post injection protocol was discussed       Follow Up: 4 weeks, PRN     To Do Next Visit: possible 2nd trigger finger CSI vs surgical discussion     PROCEDURES PERFORMED:  Hand/upper extremity injection: L thumb A1  Universal Protocol:  Consent: Verbal consent obtained  Written consent not obtained  Risks and benefits: risks, benefits and alternatives were discussed  Consent given by: patient  Time out: Immediately prior to procedure a "time out" was called to verify the correct patient, procedure, equipment, support staff and site/side marked as required    Site marked: the operative site was marked  Patient identity confirmed: verbally with patient    Supporting Documentation  Indications: pain   Procedure Details  Condition:trigger finger Location: thumb - L thumb A1   Preparation: Patient was prepped and draped in the usual sterile fashion  Needle size: 25 G  Ultrasound guidance: no  Medications administered: 0 5 mL lidocaine 1 %; 3 mg betamethasone acetate-betamethasone sodium phosphate 6 (3-3) mg/mL    Patient tolerance: patient tolerated the procedure well with no immediate complications  Dressing:  Sterile dressing applied              Scribe Attestation    I,:  Hien Reed am acting as a scribe while in the presence of the attending physician :       I,:  Rene Huerta MD personally performed the services described in this documentation    as scribed in my presence :

## 2021-04-07 ENCOUNTER — TELEPHONE (OUTPATIENT)
Dept: OBGYN CLINIC | Facility: MEDICAL CENTER | Age: 46
End: 2021-04-07

## 2021-04-07 NOTE — TELEPHONE ENCOUNTER
Typically we wait 4 weeks after injection to see if the injection can work  Sometimes it is not immediate   We can see him around 4 weeks in the office to discuss next steps which may include repeat injection or surgery

## 2021-04-07 NOTE — TELEPHONE ENCOUNTER
Patient sees Dr Mcdermott Minor  Patient calling in stating he is seeing no relief for his left thumb, he has pain and it locks, very inflammed  Not sure what his next step is  He is looking for a call back regarding this       # [phone]

## 2021-04-07 NOTE — TELEPHONE ENCOUNTER
First availability for the patient  People do find heat to be helpful  Can try antiinflammatory medications/creams but these do not always provide the best relief

## 2021-04-15 ENCOUNTER — OFFICE VISIT (OUTPATIENT)
Dept: OBGYN CLINIC | Facility: MEDICAL CENTER | Age: 46
End: 2021-04-15
Payer: COMMERCIAL

## 2021-04-15 VITALS
SYSTOLIC BLOOD PRESSURE: 155 MMHG | BODY MASS INDEX: 24.92 KG/M2 | DIASTOLIC BLOOD PRESSURE: 93 MMHG | HEIGHT: 72 IN | WEIGHT: 184 LBS | HEART RATE: 103 BPM

## 2021-04-15 DIAGNOSIS — M65.312 TRIGGER FINGER OF LEFT THUMB: Primary | ICD-10-CM

## 2021-04-15 PROCEDURE — 3008F BODY MASS INDEX DOCD: CPT | Performed by: ORTHOPAEDIC SURGERY

## 2021-04-15 PROCEDURE — 99213 OFFICE O/P EST LOW 20 MIN: CPT | Performed by: ORTHOPAEDIC SURGERY

## 2021-04-15 PROCEDURE — 1036F TOBACCO NON-USER: CPT | Performed by: ORTHOPAEDIC SURGERY

## 2021-04-15 RX ORDER — OMEPRAZOLE 20 MG/1
20 TABLET, DELAYED RELEASE ORAL DAILY
COMMUNITY

## 2021-04-15 NOTE — PROGRESS NOTES
Chief Complaint     Left thumb pain      History of Present Illness     Danielle Mitchell is a 50 y o  right hand dominant male presenting for follow-up evaluation regarding his left trigger thumb  His last visit on 03/16/2029 patient had the thumb injected with cortisone  He states that this did not provide him with any relief  He continues with painful clicking and swelling of the thumb  There is no numbness and tingling  Patient is currently working as a   Retiring at end of year and moving out of state  History reviewed  No pertinent past medical history  Past Surgical History:   Procedure Laterality Date    HERNIA REPAIR         No Known Allergies    Current Outpatient Medications on File Prior to Visit   Medication Sig Dispense Refill    omeprazole (PriLOSEC OTC) 20 MG tablet Take 20 mg by mouth daily      Multiple Vitamins-Minerals (MULTIVITAMIN ADULT PO) Take 1 tablet by mouth daily       No current facility-administered medications on file prior to visit  Social History     Tobacco Use    Smoking status: Never Smoker    Smokeless tobacco: Never Used   Substance Use Topics    Alcohol use: No    Drug use: No       Family History   Problem Relation Age of Onset    Cancer Mother     Arthritis Mother     Irritable bowel syndrome Father     Hypertension Father     Heart disease Family        Review of Systems     As stated in the HPI  All other systems were reviewed and are negative  Physical Exam     /93   Pulse 103   Ht 6' (1 829 m)   Wt 83 5 kg (184 lb)   BMI 24 95 kg/m²     GENERAL: This is a well-developed, well-nourished, age-appropriate patient in no acute distress  The patient is alert and oriented x3  Pleasant and cooperative  Eyes: Anicteric sclerae  Extraocular movements appear intact  HENT: Nares are patent with no drainage  Lungs: There is equal chest rise on inspection   Breathing is non-labored with no audible wheezing  Cardiovascular: No cyanosis  No upper extremity lymphadema  Skin: Skin is warm to touch  No obvious skin lesions or rashes other than described below  Neurologic: No ataxia  Psychiatric: Mood and affect are appropriate  Left thumb:  Skin intact  No erythema or ecchymosis   Swelling over the palmar aspect of the thumb  Improved tenderness over A1 pulley   Positive triggering on exam today  DPC 0  5/5 Motor to the APB, FDI, FDP2, FDP5, EDC  Sensation intact to light touch in the median, radial, and ulnar nerve distribution  Brisk capillary refill noted in all digits   Palpable distal radial pulse    Data Review     Results Reviewed     None           Imaging:  None today    Assessment and Plan      Diagnoses and all orders for this visit:    Trigger finger of left thumb    Other orders  -     omeprazole (PriLOSEC OTC) 20 MG tablet; Take 20 mg by mouth daily        47-year-old male with left trigger thumb  Patient did not have any success with his first cortisone injection  He does not wish to have a second one today and that is perfectly fine  He is leaning more towards surgical management of this however he would like to take some more time to work out the details for when this is a good time to do so  He may continue activities as tolerated in the meantime and return if and when he is ready for surgery      Follow Up: PRN    To Do Next Visit: possible consent for sx    PROCEDURES PERFORMED:    No Procedures performed today      Scribe Attestation    I,:  Delia Johnson MA am acting as a scribe while in the presence of the attending physician :       I,:  Martha Woods MD personally performed the services described in this documentation    as scribed in my presence :

## 2021-06-02 ENCOUNTER — OFFICE VISIT (OUTPATIENT)
Dept: OBGYN CLINIC | Facility: MEDICAL CENTER | Age: 46
End: 2021-06-02
Payer: COMMERCIAL

## 2021-06-02 VITALS
BODY MASS INDEX: 24.92 KG/M2 | HEIGHT: 72 IN | HEART RATE: 98 BPM | SYSTOLIC BLOOD PRESSURE: 121 MMHG | WEIGHT: 184 LBS | DIASTOLIC BLOOD PRESSURE: 82 MMHG

## 2021-06-02 DIAGNOSIS — M65.312 TRIGGER FINGER OF LEFT THUMB: Primary | ICD-10-CM

## 2021-06-02 PROCEDURE — 1036F TOBACCO NON-USER: CPT | Performed by: ORTHOPAEDIC SURGERY

## 2021-06-02 PROCEDURE — 99213 OFFICE O/P EST LOW 20 MIN: CPT | Performed by: ORTHOPAEDIC SURGERY

## 2021-06-02 PROCEDURE — 3008F BODY MASS INDEX DOCD: CPT | Performed by: ORTHOPAEDIC SURGERY

## 2021-06-02 NOTE — PROGRESS NOTES
Chief Complaint     Left thumb pain and clicking      History of Present Illness     Joanne Sutton is a 48 y o  right hand dominant male presenting for follow up regarding his left trigger thumb  He has had one cortisone injection at this point with no relief  He is here today to discuss surgery given his persistent symptoms  He now notes that when his thumb triggers, there is popping across the volar wrist as well  Patient is currently working as   History reviewed  No pertinent past medical history  Past Surgical History:   Procedure Laterality Date    HERNIA REPAIR         No Known Allergies    Current Outpatient Medications on File Prior to Visit   Medication Sig Dispense Refill    Probiotic Product (PROBIOTIC PO) Take 1 tablet by mouth daily      Multiple Vitamins-Minerals (MULTIVITAMIN ADULT PO) Take 1 tablet by mouth daily      omeprazole (PriLOSEC OTC) 20 MG tablet Take 20 mg by mouth daily       No current facility-administered medications on file prior to visit  Social History     Tobacco Use    Smoking status: Never Smoker    Smokeless tobacco: Never Used   Substance Use Topics    Alcohol use: No    Drug use: No       Family History   Problem Relation Age of Onset    Cancer Mother     Arthritis Mother     Irritable bowel syndrome Father     Hypertension Father     Heart disease Family        Review of Systems     As stated in the HPI  All other systems were reviewed and are negative  Physical Exam     /82   Pulse 98   Ht 6' (1 829 m)   Wt 83 5 kg (184 lb)   BMI 24 95 kg/m²     GENERAL: This is a well-developed, well-nourished, age-appropriate patient in no acute distress  The patient is alert and oriented x3  Pleasant and cooperative  Eyes: Anicteric sclerae  Extraocular movements appear intact  HENT: Nares are patent with no drainage  Lungs: There is equal chest rise on inspection   Breathing is non-labored with no audible wheezing  Cardiovascular: No cyanosis  No upper extremity lymphadema  Skin: Skin is warm to touch  No obvious skin lesions or rashes other than described below  Neurologic: No ataxia  Psychiatric: Mood and affect are appropriate  Left thumb:  Skin intact  No erythema or ecchymosis   Swelling over the palmar aspect of the thumb  Minimal tenderness over A1 pulley   Positive triggering on exam today at the A1 pulley but also triggering noted over carpal tunnel with thumb flexion and extension   DPC 0  5/5 Motor to the APB, FDI, FDP2, FDP5, EDC  Sensation intact to light touch in the median, radial, and ulnar nerve distribution  Brisk capillary refill noted in all digits   Palpable distal radial pulse    Data Review     Labs:  None today    Electrodiagnostic Testing:  None today    Imaging:  None today    Assessment and Plan      Diagnoses and all orders for this visit:    Trigger finger of left thumb  -     US MSK limited; Future    Other orders  -     Probiotic Product (PROBIOTIC PO); Take 1 tablet by mouth daily       43-year-old male with left trigger thumb  Patient presents today with new triggering over the carpal tunnel with flexion and extension of the thumb which is an atypical form of triggering at the [address] [place] [address]  He was hoping to discuss surgical release of the A1 pulley however I would like to further evaluate this new symptom and I would be worried that releasing the trigger may not adequately address the catching and clicking that he is having potentially within the carpal tunnel as well  Is possible that his FPL as gliding over an osteophyte or the scaphoid tubercle  Patient was agreeable to having ultrasound done to further evaluate this  He will follow up after this is done for surgical planning  He may resume activities as tolerated        Follow Up: after US    To Do Next Visit: repeat exam, possible surgical consent    PROCEDURES PERFORMED:    No Procedures performed today    Scribe Attestation I,:  Georges Starks MA am acting as a scribe while in the presence of the attending physician :       I,:  Dianna Shea MD personally performed the services described in this documentation    as scribed in my presence :

## 2021-06-17 ENCOUNTER — HOSPITAL ENCOUNTER (OUTPATIENT)
Dept: RADIOLOGY | Facility: HOSPITAL | Age: 46
Discharge: HOME | End: 2021-06-17
Attending: ORTHOPAEDIC SURGERY
Payer: COMMERCIAL

## 2021-06-17 DIAGNOSIS — M65.312 TRIGGER FINGER OF LEFT THUMB: ICD-10-CM

## 2021-06-17 PROCEDURE — 76882 US LMTD JT/FCL EVL NVASC XTR: CPT

## 2021-06-23 ENCOUNTER — OFFICE VISIT (OUTPATIENT)
Dept: OBGYN CLINIC | Facility: MEDICAL CENTER | Age: 46
End: 2021-06-23
Payer: COMMERCIAL

## 2021-06-23 VITALS
HEIGHT: 72 IN | WEIGHT: 184 LBS | HEART RATE: 89 BPM | SYSTOLIC BLOOD PRESSURE: 130 MMHG | BODY MASS INDEX: 24.92 KG/M2 | DIASTOLIC BLOOD PRESSURE: 80 MMHG

## 2021-06-23 DIAGNOSIS — M65.312 TRIGGER FINGER OF LEFT THUMB: ICD-10-CM

## 2021-06-23 DIAGNOSIS — D48.1 GIANT CELL TUMOR OF TENDON SHEATH: Primary | ICD-10-CM

## 2021-06-23 PROCEDURE — 1036F TOBACCO NON-USER: CPT | Performed by: ORTHOPAEDIC SURGERY

## 2021-06-23 PROCEDURE — 99214 OFFICE O/P EST MOD 30 MIN: CPT | Performed by: ORTHOPAEDIC SURGERY

## 2021-06-23 PROCEDURE — 3008F BODY MASS INDEX DOCD: CPT | Performed by: ORTHOPAEDIC SURGERY

## 2021-06-23 NOTE — PROGRESS NOTES
Chief Complaint     Left thumb pain      History of Present Illness     Dot St is a 48 y o  right hand dominant male presenting for follow-up regarding his left thumb and review of his ultrasound  Patient does continue to note pain in the thumb with popping  There been no changes  Patient is currently working as a   No past medical history on file  Past Surgical History:   Procedure Laterality Date    HERNIA REPAIR         No Known Allergies    Current Outpatient Medications on File Prior to Visit   Medication Sig Dispense Refill    Multiple Vitamins-Minerals (MULTIVITAMIN ADULT PO) Take 1 tablet by mouth daily      omeprazole (PriLOSEC OTC) 20 MG tablet Take 20 mg by mouth daily      Probiotic Product (PROBIOTIC PO) Take 1 tablet by mouth daily       No current facility-administered medications on file prior to visit  Social History     Tobacco Use    Smoking status: Never Smoker    Smokeless tobacco: Never Used   Vaping Use    Vaping Use: Never used   Substance Use Topics    Alcohol use: No    Drug use: No       Family History   Problem Relation Age of Onset    Cancer Mother     Arthritis Mother     Irritable bowel syndrome Father     Hypertension Father     Heart disease Family        Review of Systems     As stated in the HPI  All other systems were reviewed and are negative  Physical Exam     /80   Pulse 89   Ht 6' (1 829 m)   Wt 83 5 kg (184 lb)   BMI 24 95 kg/m²     GENERAL: This is a well-developed, well-nourished, age-appropriate patient in no acute distress  The patient is alert and oriented x3  Pleasant and cooperative  Eyes: Anicteric sclerae  Extraocular movements appear intact  HENT: Nares are patent with no drainage  Lungs: There is equal chest rise on inspection  Breathing is non-labored with no audible wheezing  Cardiovascular: No cyanosis  No upper extremity lymphadema  Skin: Skin is warm to touch   No obvious skin lesions or rashes other than described below  Neurologic: No ataxia  Psychiatric: Mood and affect are appropriate  Left thumb:  Skin intact   No erythema or ecchymosis   Swelling over the palmar aspect of the thumb  Minimal tenderness over A1 pulley   Positive triggering on exam today at the A1 pulley but also triggering noted over carpal tunnel with thumb flexion and extension   DPC 0  5/5 Motor to the APB, FDI, FDP2, FDP5, EDC  Sensation intact to light touch in the median, radial, and ulnar nerve distribution  Brisk capillary refill noted in all digits   Palpable distal radial pulse  No palmaris tendon    Data Review     Labs:  None today    Electrodiagnostic Testing:  None today    Imaging:  US completed 6/17/2021 was personally reviewed by me and demonstrates a mass over FPL tendon at distal carpal tunnel     Assessment and Plan      Diagnoses and all orders for this visit:    Giant cell tumor of tendon sheath    Trigger finger of left thumb       18-year-old male with left thumb mass on FPL tendon at carpal tunnel causing triggering  Discussed with the patient both non-surgical and surgical options  The risks of leaving this mass in the hand are that the mass could enlarge and create impingement on other surrounding structures, it could continue to irritate tendons and with this snapping, perhaps even irritate the tendons so much that it phrase or ruptures though that would be possibly a low likelihood  Surgical procedure for this would be a fairly involved surgery given the location that the masses in the palm of the hand  I would recommend a carpal tunnel release with deep mass excision with a trigger thumb release and possible tendon repair versus reconstruction if the FPL tendon or surrounding flexor tendons were noted to be significantly injured  Patient does not have a palmaris longus tendon  All risks versus benefits were discussed    Patient does have some work  Obligations that he would like to work around  He did scheduled for surgery after written consent was obtained in the office today however this may need to be moved        Follow Up: post-op    To Do Next Visit: sutures out    PROCEDURES PERFORMED:    No Procedures performed today    Scribe Attestation    I,:  Mikki Moctezuma MA am acting as a scribe while in the presence of the attending physician :       I,:  Geraldine Daily MD personally performed the services described in this documentation    as scribed in my presence :

## 2021-06-24 ENCOUNTER — TELEPHONE (OUTPATIENT)
Dept: OBGYN CLINIC | Facility: HOSPITAL | Age: 46
End: 2021-06-24

## 2021-06-24 NOTE — TELEPHONE ENCOUNTER
Patient is asking us to email a medical release form for his records with Dr Estrellita Ortiz  Patient is going elsewhere  Patient will stop in for the form

## 2021-07-20 ENCOUNTER — HOSPITAL ENCOUNTER (OUTPATIENT)
Dept: MRI IMAGING | Facility: HOSPITAL | Age: 46
Discharge: HOME | End: 2021-07-20
Attending: ORTHOPAEDIC SURGERY
Payer: COMMERCIAL

## 2021-07-20 DIAGNOSIS — R22.32 LOCALIZED SWELLING, MASS, OR LUMP OF UPPER EXTREMITY, LEFT: ICD-10-CM

## 2021-07-20 PROCEDURE — 73218 MRI UPPER EXTREMITY W/O DYE: CPT

## 2021-10-28 ENCOUNTER — TELEPHONE (OUTPATIENT)
Dept: INTERNAL MEDICINE CLINIC | Facility: CLINIC | Age: 46
End: 2021-10-28

## 2021-10-28 ENCOUNTER — OFFICE VISIT (OUTPATIENT)
Dept: INTERNAL MEDICINE CLINIC | Facility: CLINIC | Age: 46
End: 2021-10-28
Payer: COMMERCIAL

## 2021-10-28 DIAGNOSIS — Z23 ENCOUNTER FOR IMMUNIZATION: ICD-10-CM

## 2021-10-28 DIAGNOSIS — H66.90 ACUTE OTITIS MEDIA, UNSPECIFIED OTITIS MEDIA TYPE: Primary | ICD-10-CM

## 2021-10-28 DIAGNOSIS — E78.2 MODERATE MIXED HYPERLIPIDEMIA NOT REQUIRING STATIN THERAPY: ICD-10-CM

## 2021-10-28 PROCEDURE — 90686 IIV4 VACC NO PRSV 0.5 ML IM: CPT

## 2021-10-28 PROCEDURE — 90471 IMMUNIZATION ADMIN: CPT

## 2021-10-28 PROCEDURE — 99214 OFFICE O/P EST MOD 30 MIN: CPT | Performed by: INTERNAL MEDICINE

## 2021-10-28 PROCEDURE — 1036F TOBACCO NON-USER: CPT | Performed by: INTERNAL MEDICINE

## 2021-10-28 RX ORDER — AZITHROMYCIN 250 MG/1
TABLET, FILM COATED ORAL
COMMUNITY
Start: 2021-09-05

## 2021-10-28 RX ORDER — AMOXICILLIN AND CLAVULANATE POTASSIUM 875; 125 MG/1; MG/1
1 TABLET, FILM COATED ORAL EVERY 12 HOURS SCHEDULED
Qty: 14 TABLET | Refills: 0 | Status: SHIPPED | OUTPATIENT
Start: 2021-10-28 | End: 2021-11-04

## 2021-10-28 RX ORDER — OXYCODONE HYDROCHLORIDE 5 MG/1
TABLET ORAL
COMMUNITY
Start: 2021-08-19

## 2021-11-04 ENCOUNTER — TELEPHONE (OUTPATIENT)
Dept: INTERNAL MEDICINE CLINIC | Facility: CLINIC | Age: 46
End: 2021-11-04

## 2021-11-08 ENCOUNTER — TELEPHONE (OUTPATIENT)
Dept: INTERNAL MEDICINE CLINIC | Facility: CLINIC | Age: 46
End: 2021-11-08

## 2021-11-27 ENCOUNTER — HOSPITAL ENCOUNTER (OUTPATIENT)
Dept: RADIOLOGY | Facility: HOSPITAL | Age: 46
Discharge: HOME | End: 2021-11-27
Payer: COMMERCIAL

## 2021-11-27 DIAGNOSIS — J32.0 CHRONIC MAXILLARY SINUSITIS: ICD-10-CM

## 2021-11-27 DIAGNOSIS — H66.90 ACUTE OTITIS MEDIA, UNSPECIFIED OTITIS MEDIA TYPE: ICD-10-CM

## 2021-11-27 PROCEDURE — G1004 CDSM NDSC: HCPCS

## 2021-11-27 PROCEDURE — 70486 CT MAXILLOFACIAL W/O DYE: CPT

## 2022-01-20 ENCOUNTER — TELEPHONE (OUTPATIENT)
Dept: INTERNAL MEDICINE CLINIC | Facility: CLINIC | Age: 47
End: 2022-01-20

## 2022-01-20 NOTE — TELEPHONE ENCOUNTER
Patient had called asking for immunization record  There was nothing in [address]  Patient was last seen on 2/5/2009  I attempted to call the patient twice and let him know we had no record to send to him  I kept receiving a message that said   "Telephone number is not available at this crys   Please try again later "

## 2022-02-01 ENCOUNTER — TELEPHONE (OUTPATIENT)
Dept: INTERNAL MEDICINE CLINIC | Facility: CLINIC | Age: 47
End: 2022-02-01

## 2022-02-01 NOTE — TELEPHONE ENCOUNTER
PT CALLED, SAID THAT HE WAS TOLD BY HIS NEW EMPLOYER THAT HE'S NO LONGER IMMUNED TO THE [employer]     PT IS WONDER IF HE NEEDS TO HAVE THE MMR VACCINE AGAIN DUE TO THIS   SAID THAT HIS NEW JOB IS IN A HOSPITAL    PT HAD ASKED TO TALK TO YOU ABOUT THIS BUT I TOLD HIM I'D GIVE YOU THE MESSAGE     PLEASE ADVISE

## 2022-02-01 NOTE — TELEPHONE ENCOUNTER
He will need to be revaccined with the MMR vaccine  It is a series of 2 vaccines  He should get the first one then 4 weeks later get the second shot  He can obtain it though his employee health office or at his local pharmacy

## 2025-02-04 NOTE — PROGRESS NOTES
"I have reviewed the surgical (or preoperative) H&P that is linked to this encounter, and examined the patient. There are no significant changes    Clinical Conditions Present on Arrival:  Clinically Significant Risk Factors Present on Admission     # Drug Induced Platelet Defect: home medication list includes an antiplatelet medication      # Overweight: Estimated body mass index is 27.6 kg/m  as calculated from the following:    Height as of 2/3/25: 1.562 m (5' 1.5\").    Weight as of 2/3/25: 67.4 kg (148 lb 8 oz).       "
Daily Note     Today's date: 2019  Patient name: Zachary Hennessy  : 1975  MRN: [id number]  Referring provider: Self, Referral  Dx:   Encounter Diagnosis     ICD-10-CM    1  Acute pain of left knee M25 [id number]                   Subjective: Pt reports no posterior knee pain, mild anterolateral L knee pain with descending stairs  He reports good tolerance to 6" ecc lateral step-downs and good response to taping last visit  Objective: See treatment diary below  Edema: 1+  PROM: EXT: 2 deg lacking    Assessment: Pt demonstrated improved tolerance to quad strengthening, no change in ROM  Plan: Cont POC  Precautions: none  Dx: L PFPS, L semitendinosus/ medial capsule irritation      Manual  9/5 9/10 9/13 9/18 9/20 9/24 9/26      Graston semitendinosus 5 min 5 min 5 min 3 min 3 min 3 min 3 min      [place]aston distal lateral quad 3 min 3 min 3 min 5 min 5 min 5 min 4 min      Laser semitendinosus/ medial capsule 2000J 3300J 3000J  4000J np np      Laser patella 2000J 3300J 3000J 4000J 5000J 5500J 5500J      Medial patella taping      3 min 3 min      L knee PROM  5"x5 ea 5"x5 ea   5"x5 ea 5"x5 ea          Exercise Diary  9/5 9/10 9/13 9/18 9/20 9/24 9/26      Standing GA stretch 15"x3 15"x3 15"x3 15":x3 15"x3 15"x3 15"x3      DL ecc GA heelraises  3x10 3x10 3x10 3x10  DL/ SL con/ecc  3x10      Standing ecc knee flexion  2x10 2x10 3x10 3x10        Supine HA stretch with knee block 15"x3 15"x3 15"x3 15"x3 15"x3 15"x3 15"x3      Supine heelslides 5"x10 5"x20 5"x20 5"x20  5"x20 5"x20      Quad sets  5"x5 5"x20 5"x20 5"x20  5"x20 5"x20      DLS: SLR 3Z03 4Q80 0I49 3x25         SL hip ABD  3x20 3x25 3x25         Lateral ecc step-downs    4"/ 2x10 4"/ 1x10, 6" /2x10 6"/ 3x10 8"/  3x10      Leg Press machine : SL      50#/ 3x10 60#/ 3x10 60#  3x12                                                                                                                                            Modalities
Yes...

## 2025-05-19 ENCOUNTER — APPOINTMENT (OUTPATIENT)
Dept: URGENT CARE | Facility: CLINIC | Age: 50
End: 2025-05-19

## 2025-05-19 ENCOUNTER — APPOINTMENT (OUTPATIENT)
Dept: LAB | Facility: CLINIC | Age: 50
End: 2025-05-19

## 2025-05-19 DIAGNOSIS — Z02.1 PRE-EMPLOYMENT HEALTH SCREENING EXAMINATION: ICD-10-CM

## 2025-05-19 DIAGNOSIS — Z02.1 PRE-EMPLOYMENT HEALTH SCREENING EXAMINATION: Primary | ICD-10-CM

## 2025-05-19 LAB — RUBV IGG SERPL IA-ACNC: 45.8 IU/ML

## 2025-05-19 PROCEDURE — 86762 RUBELLA ANTIBODY: CPT

## 2025-05-19 PROCEDURE — 86735 MUMPS ANTIBODY: CPT

## 2025-05-19 PROCEDURE — 36415 COLL VENOUS BLD VENIPUNCTURE: CPT

## 2025-05-19 PROCEDURE — 86787 VARICELLA-ZOSTER ANTIBODY: CPT

## 2025-05-19 PROCEDURE — 86480 TB TEST CELL IMMUN MEASURE: CPT

## 2025-05-19 PROCEDURE — 86765 RUBEOLA ANTIBODY: CPT

## 2025-05-20 LAB
GAMMA INTERFERON BACKGROUND BLD IA-ACNC: 0.05 IU/ML
M TB IFN-G BLD-IMP: NEGATIVE
M TB IFN-G CD4+ BCKGRND COR BLD-ACNC: 0.03 IU/ML
M TB IFN-G CD4+ BCKGRND COR BLD-ACNC: 0.04 IU/ML
MEV IGG SER QL IA: 72.7 AU/ML
MEV IGG SER QL IA: POSITIVE
MITOGEN IGNF BCKGRD COR BLD-ACNC: 5.86 IU/ML
MUV IGG SER QL IA: 21.8 AU/ML
MUV IGG SER QL IA: POSITIVE
VZV IGG SER QL IA: 15.8 S/CO
VZV IGG SER QL IA: POSITIVE